# Patient Record
Sex: FEMALE | Race: WHITE | NOT HISPANIC OR LATINO
[De-identification: names, ages, dates, MRNs, and addresses within clinical notes are randomized per-mention and may not be internally consistent; named-entity substitution may affect disease eponyms.]

---

## 2020-02-21 PROBLEM — Z00.00 ENCOUNTER FOR PREVENTIVE HEALTH EXAMINATION: Status: ACTIVE | Noted: 2020-02-21

## 2020-04-14 ENCOUNTER — APPOINTMENT (OUTPATIENT)
Dept: ORTHOPEDIC SURGERY | Facility: CLINIC | Age: 55
End: 2020-04-14
Payer: COMMERCIAL

## 2020-04-14 VITALS — SYSTOLIC BLOOD PRESSURE: 133 MMHG | TEMPERATURE: 98 F | DIASTOLIC BLOOD PRESSURE: 84 MMHG | HEART RATE: 67 BPM

## 2020-04-14 PROCEDURE — 99203 OFFICE O/P NEW LOW 30 MIN: CPT

## 2020-04-14 PROCEDURE — 73564 X-RAY EXAM KNEE 4 OR MORE: CPT | Mod: RT

## 2020-04-14 NOTE — DISCUSSION/SUMMARY
[de-identified] : 53 y/o female with right knee pain secondary to osteoarthritis.  A lengthy discussion was held with the patient regarding the patient’s condition and treatment options including all risks, benefits, prognosis and outcomes of each were discussed in detail. Patient seemed concerned regarding her meniscal tear however, her main issue and the reason for her pain is her osteoarthritis. The patient would like to continue with conservative management at this time. Non-operative treatment was advised and knee treatment handout was given and reviewed with the patient. Multiple non-operative treatment options have been discussed with the patient for treating knee pain secondary to osteoarthritis.\par These include:\par Weight reduction (speak to PMD, speak to nutritionist) \par Low-impact exercise specifically emphasizing bicycling, swimming and working with weights.\par Modification of stressful activities\par Change to lace up supportive well cushioned walking shoes\par Use of a knee sleeve with or without compression support and or a patellar cut-out\par P.R.N. use of acetaminophen if tolerated\par Tylenol and Aleve together as tolerated\par P.R.N. use of anti-inflammatory agents if tolerated\par Potential use of nutraceuticals\par Potential corticosteroid injection\par Potential PRP injection\par Potential hyaluronic acid general injections\par \par Possible surgical intervention if conservative treatment fails also discussed. Patient will most likely be indicated for a total knee replacement. When to do surgery was also discussed: when to do surgery. Rule number 1, do it when you want to do it. Should do the surgery when all else fails. Should do the surgery when the patient is consumed by the her knee pain and limits of daily activity. Should do surgery when the patient is ready to recover from the surgery. Should do surgery when the patient develops fixed deformity. \par \par \par The patient will contact me if there are any concerns otherwise follow up will be on a prn basis. The patient expressed understanding and all questions were answered.\par \par

## 2020-04-14 NOTE — HISTORY OF PRESENT ILLNESS
[de-identified] : 55 y/o female who is a  presents with right knee pain for years. She denies any recent injury or trauma. She went to see Dr. Nelson from [facility] and has been treated with Orthovisc for the past 4 years. The last Orthovisc did not seem to help and recent had a cortisone injection on 2/7/2020.  Her pain was a 9/10 at that time. \par \par She states the pain is a 5/10 at this visit, which she takes Advil occasionally. She scotty any numbness or tingling. She has diffficulty with stairs and ADL's. Before the cortisone injection the pain was keeping her up at night.

## 2020-04-14 NOTE — CONSULT LETTER
[Dear  ___] : Dear  [unfilled], [FreeTextEntry1] : I had the pleasure of evaluating your patient in the office today for complaints of right knee pain secondary to osteoarthritis. I have enclosed a copy of today's office notes for your charts and for your review.\par \par Sincerely, \par \par Michael Jeffrey M.D.\par Professor and \par Department of Orthopedic Surgery\par [facility]\par

## 2020-04-14 NOTE — PHYSICAL EXAM
[LE] : Sensory: Intact in bilateral lower extremities [Knee] : patellar 2+ and symmetric bilaterally [PT] : posterior tibial 2+ and symmetric bilaterally [de-identified] : 55 y/o pleasant  female in NAD and AAOx3. The pt has a mildly antalgic gait. Physical examination of both knees reveals normal contours, skin intact with no signs of infection, no erythema, no swelling, no effusion, no distal lymphedema or phlebitis, no patholaxity. ROM of the right  knee reveals 0°-120° Strength is 5/5 within this arc of motion. There is mild pain on palpation to the medial>lateral joint line. Patient noted to have genu varus. There are no neurological deficits. \par Mild retropatellar pain\par 2+ patellofemoral crepitus \par 2+ distal pulses (PT/DP) [de-identified] : X-rays were ordered, obtained, and interpreted by me today: 4 views of the right knee demonstrate medial joint space narrowing, medial and lateral spurs, tricompartmental arthritis,with no acute fracture or dislocation.\par \par MRI of right knee done 2/22/20 at [facility] reviewed, demonstrates tricompartmental arthritis, no ligament tear and no acute fracture.

## 2021-05-26 ENCOUNTER — NON-APPOINTMENT (OUTPATIENT)
Age: 56
End: 2021-05-26

## 2021-06-30 ENCOUNTER — NON-APPOINTMENT (OUTPATIENT)
Age: 56
End: 2021-06-30

## 2021-06-30 ENCOUNTER — APPOINTMENT (OUTPATIENT)
Dept: DERMATOLOGY | Facility: CLINIC | Age: 56
End: 2021-06-30
Payer: COMMERCIAL

## 2021-06-30 VITALS — WEIGHT: 174 LBS | HEIGHT: 62 IN | BODY MASS INDEX: 32.02 KG/M2

## 2021-06-30 DIAGNOSIS — Z87.39 PERSONAL HISTORY OF OTHER DISEASES OF THE MUSCULOSKELETAL SYSTEM AND CONNECTIVE TISSUE: ICD-10-CM

## 2021-06-30 DIAGNOSIS — Z78.9 OTHER SPECIFIED HEALTH STATUS: ICD-10-CM

## 2021-06-30 PROCEDURE — 99072 ADDL SUPL MATRL&STAF TM PHE: CPT

## 2021-06-30 PROCEDURE — 99204 OFFICE O/P NEW MOD 45 MIN: CPT

## 2021-06-30 RX ORDER — TERBINAFINE HYDROCHLORIDE 250 MG/1
250 TABLET ORAL
Refills: 0 | Status: ACTIVE | COMMUNITY

## 2021-06-30 NOTE — ASSESSMENT
[FreeTextEntry1] : Rosacea;  pt. states was dx and treated for this in past;\par Therapeutic options and their risks and benefits; along with multiple diagnostic possibilities were discussed at length; risks and benefits of further study were discussed;\par \par avoid picking, popping lesions; \par \par Metrogel BID, may reduce to qd when improved;  discussed SPF, cover cosmetics; \par recheck 2- 3 mos\par

## 2021-09-29 ENCOUNTER — APPOINTMENT (OUTPATIENT)
Dept: DERMATOLOGY | Facility: CLINIC | Age: 56
End: 2021-09-29
Payer: COMMERCIAL

## 2021-09-29 PROCEDURE — 99213 OFFICE O/P EST LOW 20 MIN: CPT

## 2021-09-29 NOTE — ASSESSMENT
[FreeTextEntry1] : Good control;  \par Therapeutic options and their risks and benefits; along with multiple diagnostic possibilities were discussed at length; risks and benefits of further study were discussed;\par \par continue metrogel qd;  f/u in Spring;  t/c adding other treatments prn

## 2021-10-12 ENCOUNTER — OUTPATIENT (OUTPATIENT)
Dept: OUTPATIENT SERVICES | Facility: HOSPITAL | Age: 56
LOS: 1 days | End: 2021-10-12
Payer: COMMERCIAL

## 2021-10-12 VITALS
TEMPERATURE: 98 F | HEART RATE: 55 BPM | OXYGEN SATURATION: 99 % | DIASTOLIC BLOOD PRESSURE: 82 MMHG | RESPIRATION RATE: 14 BRPM | WEIGHT: 167.99 LBS | SYSTOLIC BLOOD PRESSURE: 122 MMHG | HEIGHT: 62 IN

## 2021-10-12 DIAGNOSIS — M75.100 UNSPECIFIED ROTATOR CUFF TEAR OR RUPTURE OF UNSPECIFIED SHOULDER, NOT SPECIFIED AS TRAUMATIC: ICD-10-CM

## 2021-10-12 DIAGNOSIS — Z98.890 OTHER SPECIFIED POSTPROCEDURAL STATES: Chronic | ICD-10-CM

## 2021-10-12 DIAGNOSIS — M75.122 COMPLETE ROTATOR CUFF TEAR OR RUPTURE OF LEFT SHOULDER, NOT SPECIFIED AS TRAUMATIC: ICD-10-CM

## 2021-10-12 DIAGNOSIS — M75.100 UNSPECIFIED ROTATOR CUFF TEAR OR RUPTURE OF UNSPECIFIED SHOULDER, NOT SPECIFIED AS TRAUMATIC: Chronic | ICD-10-CM

## 2021-10-12 DIAGNOSIS — M75.120 COMPLETE ROTATOR CUFF TEAR OR RUPTURE OF UNSPECIFIED SHOULDER, NOT SPECIFIED AS TRAUMATIC: Chronic | ICD-10-CM

## 2021-10-12 DIAGNOSIS — Z98.82 BREAST IMPLANT STATUS: Chronic | ICD-10-CM

## 2021-10-12 LAB
ANION GAP SERPL CALC-SCNC: 14 MMOL/L — SIGNIFICANT CHANGE UP (ref 7–14)
BUN SERPL-MCNC: 17 MG/DL — SIGNIFICANT CHANGE UP (ref 7–23)
CALCIUM SERPL-MCNC: 9.8 MG/DL — SIGNIFICANT CHANGE UP (ref 8.4–10.5)
CHLORIDE SERPL-SCNC: 103 MMOL/L — SIGNIFICANT CHANGE UP (ref 98–107)
CO2 SERPL-SCNC: 22 MMOL/L — SIGNIFICANT CHANGE UP (ref 22–31)
CREAT SERPL-MCNC: 0.6 MG/DL — SIGNIFICANT CHANGE UP (ref 0.5–1.3)
GLUCOSE SERPL-MCNC: 75 MG/DL — SIGNIFICANT CHANGE UP (ref 70–99)
HCG SERPL-ACNC: <5 MIU/ML — SIGNIFICANT CHANGE UP
HCT VFR BLD CALC: 41.8 % — SIGNIFICANT CHANGE UP (ref 34.5–45)
HGB BLD-MCNC: 13.8 G/DL — SIGNIFICANT CHANGE UP (ref 11.5–15.5)
MCHC RBC-ENTMCNC: 29.4 PG — SIGNIFICANT CHANGE UP (ref 27–34)
MCHC RBC-ENTMCNC: 33 GM/DL — SIGNIFICANT CHANGE UP (ref 32–36)
MCV RBC AUTO: 89.1 FL — SIGNIFICANT CHANGE UP (ref 80–100)
NRBC # BLD: 0 /100 WBCS — SIGNIFICANT CHANGE UP
NRBC # FLD: 0 K/UL — SIGNIFICANT CHANGE UP
PLATELET # BLD AUTO: 244 K/UL — SIGNIFICANT CHANGE UP (ref 150–400)
POTASSIUM SERPL-MCNC: 4.1 MMOL/L — SIGNIFICANT CHANGE UP (ref 3.5–5.3)
POTASSIUM SERPL-SCNC: 4.1 MMOL/L — SIGNIFICANT CHANGE UP (ref 3.5–5.3)
RBC # BLD: 4.69 M/UL — SIGNIFICANT CHANGE UP (ref 3.8–5.2)
RBC # FLD: 13.2 % — SIGNIFICANT CHANGE UP (ref 10.3–14.5)
SODIUM SERPL-SCNC: 139 MMOL/L — SIGNIFICANT CHANGE UP (ref 135–145)
WBC # BLD: 5.79 K/UL — SIGNIFICANT CHANGE UP (ref 3.8–10.5)
WBC # FLD AUTO: 5.79 K/UL — SIGNIFICANT CHANGE UP (ref 3.8–10.5)

## 2021-10-12 PROCEDURE — 93010 ELECTROCARDIOGRAM REPORT: CPT

## 2021-10-12 NOTE — H&P PST ADULT - NSICDXPASTSURGICALHX_GEN_ALL_CORE_FT
PAST SURGICAL HISTORY:  H/O arthroscopy of right knee 10 years ago    H/O bilateral breast implants     S/P abdominoplasty     S/P  Section  &      PAST SURGICAL HISTORY:  H/O arthroscopy of right knee 10 years ago    H/O bilateral breast implants     Rotator cuff tear right ,     S/P abdominoplasty     S/P  Section  &     S/P LASIK surgery

## 2021-10-12 NOTE — H&P PST ADULT - HISTORY OF PRESENT ILLNESS
55y/o female scheduled for left shoulder arthroscopy debridement subacromial decompression rotator cuff repair biceps tenodesis on 10/26/2021.  Pt states, "has left shoulder intermittent pain for the past 5 yrs , pain progressively worsened since 3/2021.  MRI shows rotator cuff tear."

## 2021-10-12 NOTE — H&P PST ADULT - MUSCULOSKELETAL COMMENTS
bilateral knees and feet, left shoulder pain with rom, MRI shows rotator cuff tear left shoulder pain with rom

## 2021-10-12 NOTE — H&P PST ADULT - VENOUS THROMBOEMBOLISM CURRENT STATUS
PCP for Routine Care (1) other risk factor (includes escalating BMI, pack-years of smoking, diabetes requiring insulin, chemotherapy, female gender and length of surgery)

## 2021-10-12 NOTE — H&P PST ADULT - PROBLEM SELECTOR PLAN 1
Pt scheduled for left shoulder arthroscopy debridement subacromial decompression rotator cuff repair biceps tenodesis on 10/26/2021.  labs done results pending, ekg done.  Pt scheduled for preop covid testing.  Urine cup provided.  Hibiclens provided-  written and verbal instructions given, pt able to verbalize understanding.  Preop teaching done, pt able to verbalize understanding.   medications day of procedure-  pepcid  pst request  comparison ekg in chart.

## 2021-10-12 NOTE — H&P PST ADULT - ATTENDING COMMENTS
To the best of my ability as an orthopaedic surgeon, I assert this.  there are no changes orthopaedically.  Otherwise as per anesthesia and/or medicine.

## 2021-10-12 NOTE — H&P PST ADULT - NSICDXPASTMEDICALHX_GEN_ALL_CORE_FT
PAST MEDICAL HISTORY:  Rotator cuff tear right     PAST MEDICAL HISTORY:  Complete rotator cuff tear left    Rotator cuff tear right     PAST MEDICAL HISTORY:  Complete rotator cuff tear left    Gestational diabetes mellitus (GDM)     Lumbar herniated disc     Rotator cuff tear right

## 2021-10-14 ENCOUNTER — TRANSCRIPTION ENCOUNTER (OUTPATIENT)
Age: 56
End: 2021-10-14

## 2021-10-23 ENCOUNTER — APPOINTMENT (OUTPATIENT)
Dept: DISASTER EMERGENCY | Facility: CLINIC | Age: 56
End: 2021-10-23

## 2021-10-23 DIAGNOSIS — Z01.818 ENCOUNTER FOR OTHER PREPROCEDURAL EXAMINATION: ICD-10-CM

## 2021-10-24 LAB — SARS-COV-2 N GENE NPH QL NAA+PROBE: NOT DETECTED

## 2021-10-25 ENCOUNTER — TRANSCRIPTION ENCOUNTER (OUTPATIENT)
Age: 56
End: 2021-10-25

## 2021-10-25 VITALS
HEIGHT: 62 IN | TEMPERATURE: 98 F | OXYGEN SATURATION: 99 % | DIASTOLIC BLOOD PRESSURE: 84 MMHG | HEART RATE: 84 BPM | RESPIRATION RATE: 18 BRPM | SYSTOLIC BLOOD PRESSURE: 142 MMHG | WEIGHT: 167.99 LBS

## 2021-10-25 NOTE — ASU DISCHARGE PLAN (ADULT/PEDIATRIC) - CARE PROVIDER_API CALL
Rik Maloney)  Orthopaedic Surgery  [address] [place] [id number]  Phone: [phone]  Fax: [phone]  Follow Up Time: 2 weeks

## 2021-10-25 NOTE — ASU DISCHARGE PLAN (ADULT/PEDIATRIC) - FOLLOW UP APPOINTMENTS
[facility] ([facility])

## 2021-10-25 NOTE — ASU DISCHARGE PLAN (ADULT/PEDIATRIC) - PROCEDURE
L shoulder arthroscopy w/ subacromial decompression, rotator cuff repair, biceps tenodesis L shoulder arthroscopy w/ subacromial decompression, rotator cuff repair

## 2021-10-25 NOTE — ASU PREOPERATIVE ASSESSMENT, ADULT (IPARK ONLY) - PROCEDURE
Left shoulder arthroscopy debridement subacromial decompression rotator cuff repair biceps  tenodesis

## 2021-10-26 ENCOUNTER — OUTPATIENT (OUTPATIENT)
Dept: OUTPATIENT SERVICES | Facility: HOSPITAL | Age: 56
LOS: 1 days | Discharge: HOME | End: 2021-10-26

## 2021-10-26 VITALS
RESPIRATION RATE: 16 BRPM | SYSTOLIC BLOOD PRESSURE: 144 MMHG | TEMPERATURE: 98 F | HEART RATE: 83 BPM | DIASTOLIC BLOOD PRESSURE: 73 MMHG | OXYGEN SATURATION: 98 %

## 2021-10-26 DIAGNOSIS — Z98.890 OTHER SPECIFIED POSTPROCEDURAL STATES: Chronic | ICD-10-CM

## 2021-10-26 DIAGNOSIS — M75.100 UNSPECIFIED ROTATOR CUFF TEAR OR RUPTURE OF UNSPECIFIED SHOULDER, NOT SPECIFIED AS TRAUMATIC: Chronic | ICD-10-CM

## 2021-10-26 DIAGNOSIS — Z98.82 BREAST IMPLANT STATUS: Chronic | ICD-10-CM

## 2021-10-26 DIAGNOSIS — M75.122 COMPLETE ROTATOR CUFF TEAR OR RUPTURE OF LEFT SHOULDER, NOT SPECIFIED AS TRAUMATIC: ICD-10-CM

## 2021-10-26 RX ORDER — OXYCODONE HYDROCHLORIDE 5 MG/1
5 TABLET ORAL ONCE
Refills: 0 | Status: DISCONTINUED | OUTPATIENT
Start: 2021-10-26 | End: 2021-10-26

## 2021-10-26 RX ORDER — HYDROMORPHONE HYDROCHLORIDE 2 MG/ML
0.25 INJECTION INTRAMUSCULAR; INTRAVENOUS; SUBCUTANEOUS ONCE
Refills: 0 | Status: DISCONTINUED | OUTPATIENT
Start: 2021-10-26 | End: 2021-10-26

## 2021-10-26 RX ORDER — ONDANSETRON 8 MG/1
4 TABLET, FILM COATED ORAL ONCE
Refills: 0 | Status: DISCONTINUED | OUTPATIENT
Start: 2021-10-26 | End: 2021-11-09

## 2021-10-26 RX ORDER — ASCORBIC ACID 60 MG
1 TABLET,CHEWABLE ORAL
Qty: 0 | Refills: 0 | DISCHARGE

## 2021-10-26 RX ORDER — METOCLOPRAMIDE HCL 10 MG
10 TABLET ORAL ONCE
Refills: 0 | Status: DISCONTINUED | OUTPATIENT
Start: 2021-10-26 | End: 2021-11-09

## 2022-05-10 ENCOUNTER — RESULT REVIEW (OUTPATIENT)
Age: 57
End: 2022-05-10

## 2022-06-16 PROBLEM — M75.120 COMPLETE ROTATOR CUFF TEAR OR RUPTURE OF UNSPECIFIED SHOULDER, NOT SPECIFIED AS TRAUMATIC: Chronic | Status: ACTIVE | Noted: 2021-10-12

## 2022-06-16 PROBLEM — O24.419 GESTATIONAL DIABETES MELLITUS IN PREGNANCY, UNSPECIFIED CONTROL: Chronic | Status: ACTIVE | Noted: 2021-10-12

## 2022-06-16 PROBLEM — M51.26 OTHER INTERVERTEBRAL DISC DISPLACEMENT, LUMBAR REGION: Chronic | Status: ACTIVE | Noted: 2021-10-12

## 2022-06-24 ENCOUNTER — APPOINTMENT (OUTPATIENT)
Dept: ORTHOPEDIC SURGERY | Facility: CLINIC | Age: 57
End: 2022-06-24

## 2022-06-24 VITALS — BODY MASS INDEX: 30.91 KG/M2 | WEIGHT: 168 LBS | HEIGHT: 62 IN

## 2022-06-24 PROCEDURE — 99213 OFFICE O/P EST LOW 20 MIN: CPT | Mod: 25

## 2022-06-24 PROCEDURE — J3490M: CUSTOM

## 2022-06-24 PROCEDURE — 20610 DRAIN/INJ JOINT/BURSA W/O US: CPT | Mod: RT

## 2022-06-24 NOTE — PROCEDURE
[Large Joint Injection] : Large joint injection [Bilateral] : bilaterally of the [Knee] : knee [Pain] : pain [Inflammation] : inflammation [Betadine] : betadine [Ethyl Chloride sprayed topically] : ethyl chloride sprayed topically [___ cc    1%] : Lidocaine ~Vcc of 1%  [___ cc    40mg] : Methylprednisolone (Depomedrol) ~Vcc of 40 mg  [] : Patient tolerated procedure well [Call if redness, pain or fever occur] : call if redness, pain or fever occur [Apply ice for 15min out of every hour for the next 12-24 hours as tolerated] : apply ice for 15 minutes out of every hour for the next 12-24 hours as tolerated [Risks, benefits, alternatives discussed / Verbal consent obtained] : the risks benefits, and alternatives have been discussed, and verbal consent was obtained

## 2022-06-24 NOTE — HISTORY OF PRESENT ILLNESS
[6] : 6 [Sharp] : sharp [Intermittent] : intermittent [Nothing helps with pain getting better] : Nothing helps with pain getting better [Standing] : standing [Stairs] : stairs [de-identified] : 06/24/22:  Patient returns today with recurrent bilateral knee pain x last few weeks duration. Known to have OA both knees. Previous cortisone injections to both knees five months ago.Leaving for vacation and would like to repeat the injections.\par \par MRI Right Knee from February of 2020  \par Impression:\par 1. 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 Mild MCL laxity.\par 3. No acute fracture.\par ________________________________\par \par 1/17/22 Returns today c/o recurring bilateral knee pain x last 2 weeks duration. Worse stairclimbing. Had a cortisone injections x 4 months ago and did well until now.\par \par 09/14/21: Returns today with complaint of recurrent bilateral knee pain. Last cortisone injections to both knees in June of this year which did help, but she did have a fall about five days ago on steps and her right knee pain increased again. Currently it has been improving each day, but pain is worse when getting up from sitting and walking up and down steps. Advil 600 mg for pain, which did help.\par \par 06/28/21: Here today with recurrent bilateral knee pain x last 2-3 weeks duration. had excellent relief from her last cortisone injections x 5 months ago. Would like to repeat injections.\par \par 01/26/21: Returns today with recurrent bilateral knee pain. Last injection in the right knee in November of last year. Pain is right worse than left and worse going down steps more than up steps. No wake up pain. Deals with the pain when it happens.\par \par 11/5/20 returns today c/o recurring rt knee pain x last few weeks duration. Worse stairclimbing and lying in bed. last cortisone injection x 9 months ago.\par \par 3/3/20: Pt here for MRI F/U of the right knee. Pt states she feels 75% after cortisone injection last visit. Not going to PT at this moment\par \par 2/7/20 Returns today will persistent rt knee pain despite the series of orthovisc injections. Lt knee is feeling better. leaving for vacation out of the country tomorrow.\par \par 1/24/20 here for Orthovisc injections #4 both knees.\par \par 1/17/20 Here for Orthovisc injections #3 both knees.\par \par 01/10/20: Returns today for Orthovisc injections #2 both knees. Reports no relief as of yet.\par \par 1/3/20 Here for Orthovisc injections #1 both knees.\par \par 12/20/19: Returns today c/o recurring bilateral knee pain rt > lt x last few weeks duration.Takes no nsaids. Last Orthovisc injections were 8 months ago. Would like to repeat the injections. \par \par 10/3/16: PATIENT IS A 52 YO FEMALE C/O LEFT KNEE PAIN FOR "YEARS." SHE HAS BEEN DX WITH OA BY DR. MENON AND TREATED WITH 2 ORTHOVISC SERIES WHICH SHE HAS HAD GOOD RELIEF WITH. NO N/T. SHE IS CURRENTLY TAKING MELOXICAM. PAIN IS WORSE WITH STAIRS. NO PREVIOUS INJURIES OR SURGERIES TO LEFT KNEE. PAIN OCC. WAKES HER FROM SLEEP.\par OCCUPATION: \par 10/17/16: HERE FOR ORTHOVISC #3 LEFT KNEE.\par 10/24/16: HERE TO FOR ORTHOVISC #4 LEFT KNEE.\par 4/24/17: HERE TODAY FOR NEW INJURY TO HER RIGHT KNEE. STATES HER PAIN HAS FOR 1 MONTH. NO N/T. NOT TAKING ANY MEDICATION FOR PAIN. LEFT KNEE HAS BEEN DOING WELL FROM ORTHOVISC SERIES. RIGHT KNEE WAS PAINFUL WITH STAIRS.\par 10/30/17: HERE TO F/U ON BILATERAL KNEES. STATES GOOD RELIEF WITH VISCO IN LEFT. RIGHT KNEE PAIN PERSISTS, SIMILAR PAIN TO LEFT KNEE.\par 11/6/17: ORTHOVISC #2 BILATERAL KNEES\par 11/13/17: ORTHOVISC #3 BILATERAL KNEES\par 11/20/17: ORTHOVISC #4 BILATERAL KNEES\par 8/30/18- for continued orthovisc both knees #2\par 9/6/18: ORTHOVISC #3 BILAT KNEES\par 9/13/18: ORTHOVISC #4 BILAT KNEES\par 4/4/19: HERE TO F/U ON BILATERAL KNEES. ORTHOVISC GAVE GREAT RELIEF, PAIN STARTED TO RETURN 2 WEEKS AGO.\par 4/19/19 Here for Orthovisc injections #3 both knees.\par 4/26/19 here for Orthovisc injections #4 both knees. [] : no [FreeTextEntry1] : bilateral knees [de-identified] : 1/17/22 [de-identified] : dr garibay

## 2022-06-24 NOTE — PHYSICAL EXAM
[Normal Mood and Affect] : normal mood and affect [Orientated] : orientated [Able to Communicate] : able to communicate [Well Developed] : well developed [Well Nourished] : well nourished [Right] : right knee [Left] : left knee [NL (0)] : extension 0 degrees [5___] : hamstring 5[unfilled]/5 [Negative] : negative anterior draw [] : non-antalgic [TWNoteComboBox7] : flexion 130 degrees

## 2022-10-07 ENCOUNTER — NON-APPOINTMENT (OUTPATIENT)
Age: 57
End: 2022-10-07

## 2022-11-21 ENCOUNTER — NON-APPOINTMENT (OUTPATIENT)
Age: 57
End: 2022-11-21

## 2022-12-01 ENCOUNTER — APPOINTMENT (OUTPATIENT)
Dept: ORTHOPEDIC SURGERY | Facility: CLINIC | Age: 57
End: 2022-12-01

## 2022-12-01 VITALS — WEIGHT: 168 LBS | BODY MASS INDEX: 30.91 KG/M2 | HEIGHT: 62 IN

## 2022-12-01 PROCEDURE — 20610 DRAIN/INJ JOINT/BURSA W/O US: CPT | Mod: RT

## 2022-12-01 PROCEDURE — 99213 OFFICE O/P EST LOW 20 MIN: CPT | Mod: 25

## 2022-12-01 NOTE — HISTORY OF PRESENT ILLNESS
[Sharp] : sharp [Intermittent] : intermittent [Nothing helps with pain getting better] : Nothing helps with pain getting better [Standing] : standing [Stairs] : stairs [2] : 2 [de-identified] : 12/01/22:  Returns today with complaint of recurrent bilateral knee pain.  Previous cortisone injections to both knees about 5 1/2 months ago which helped until now.\par \par 06/24/22:  Patient returns today with recurrent bilateral knee pain x last few weeks duration. Known to have OA both knees. Previous cortisone injections to both knees five months ago.Leaving for vacation and would like to repeat the injections.\par \par MRI Right Knee from February of 2020  \par Impression:\par 1. 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 Mild MCL laxity.\par 3. No acute fracture.\par ________________________________\par \par 1/17/22 Returns today c/o recurring bilateral knee pain x last 2 weeks duration. Worse stairclimbing. Had a cortisone injections x 4 months ago and did well until now.\par \par 09/14/21: Returns today with complaint of recurrent bilateral knee pain. Last cortisone injections to both knees in June of this year which did help, but she did have a fall about five days ago on steps and her right knee pain increased again. Currently it has been improving each day, but pain is worse when getting up from sitting and walking up and down steps. Advil 600 mg for pain, which did help.\par \par 06/28/21: Here today with recurrent bilateral knee pain x last 2-3 weeks duration. had excellent relief from her last cortisone injections x 5 months ago. Would like to repeat injections.\par \par 01/26/21: Returns today with recurrent bilateral knee pain. Last injection in the right knee in November of last year. Pain is right worse than left and worse going down steps more than up steps. No wake up pain. Deals with the pain when it happens.\par \par 11/5/20 returns today c/o recurring rt knee pain x last few weeks duration. Worse stairclimbing and lying in bed. last cortisone injection x 9 months ago.\par \par 3/3/20: Pt here for MRI F/U of the right knee. Pt states she feels 75% after cortisone injection last visit. Not going to PT at this moment\par \par 2/7/20 Returns today will persistent rt knee pain despite the series of orthovisc injections. Lt knee is feeling better. leaving for vacation out of the country tomorrow.\par \par 1/24/20 here for Orthovisc injections #4 both knees.\par \par 1/17/20 Here for Orthovisc injections #3 both knees.\par \par 01/10/20: Returns today for Orthovisc injections #2 both knees. Reports no relief as of yet.\par \par 1/3/20 Here for Orthovisc injections #1 both knees.\par \par 12/20/19: Returns today c/o recurring bilateral knee pain rt > lt x last few weeks duration.Takes no nsaids. Last Orthovisc injections were 8 months ago. Would like to repeat the injections. \par \par 10/3/16: PATIENT IS A 52 YO FEMALE C/O LEFT KNEE PAIN FOR "YEARS." SHE HAS BEEN DX WITH OA BY DR. MENON AND TREATED WITH 2 ORTHOVISC SERIES WHICH SHE HAS HAD GOOD RELIEF WITH. NO N/T. SHE IS CURRENTLY TAKING MELOXICAM. PAIN IS WORSE WITH STAIRS. NO PREVIOUS INJURIES OR SURGERIES TO LEFT KNEE. PAIN OCC. WAKES HER FROM SLEEP.\par OCCUPATION: \par 10/17/16: HERE FOR ORTHOVISC #3 LEFT KNEE.\par 10/24/16: HERE TO FOR ORTHOVISC #4 LEFT KNEE.\par 4/24/17: HERE TODAY FOR NEW INJURY TO HER RIGHT KNEE. STATES HER PAIN HAS FOR 1 MONTH. NO N/T. NOT TAKING ANY MEDICATION FOR PAIN. LEFT KNEE HAS BEEN DOING WELL FROM ORTHOVISC SERIES. RIGHT KNEE WAS PAINFUL WITH STAIRS.\par 10/30/17: HERE TO F/U ON BILATERAL KNEES. STATES GOOD RELIEF WITH VISCO IN LEFT. RIGHT KNEE PAIN PERSISTS, SIMILAR PAIN TO LEFT KNEE.\par 11/6/17: ORTHOVISC #2 BILATERAL KNEES\par 11/13/17: ORTHOVISC #3 BILATERAL KNEES\par 11/20/17: ORTHOVISC #4 BILATERAL KNEES\par 8/30/18- for continued orthovisc both knees #2\par 9/6/18: ORTHOVISC #3 BILAT KNEES\par 9/13/18: ORTHOVISC #4 BILAT KNEES\par 4/4/19: HERE TO F/U ON BILATERAL KNEES. ORTHOVISC GAVE GREAT RELIEF, PAIN STARTED TO RETURN 2 WEEKS AGO.\par 4/19/19 Here for Orthovisc injections #3 both knees.\par 4/26/19 here for Orthovisc injections #4 both knees. [] : no [FreeTextEntry1] : bilateral knees [de-identified] : 1/17/22 [de-identified] : dr garibay

## 2022-12-01 NOTE — PROCEDURE
[Large Joint Injection] : Large joint injection [Bilateral] : bilaterally of the [Knee] : knee [Pain] : pain [X-ray evidence of Osteoarthritis on this or prior visit] : x-ray evidence of Osteoarthritis on this or prior visit [___ cc    1%] : Lidocaine ~Vcc of 1%  [___ cc    40mg] : Methylprednisolone (Depomedrol) ~Vcc of 40 mg  [] : Patient tolerated procedure well [Call if redness, pain or fever occur] : call if redness, pain or fever occur [Apply ice for 15min out of every hour for the next 12-24 hours as tolerated] : apply ice for 15 minutes out of every hour for the next 12-24 hours as tolerated [Risks, benefits, alternatives discussed / Verbal consent obtained] : the risks benefits, and alternatives have been discussed, and verbal consent was obtained

## 2023-01-20 ENCOUNTER — APPOINTMENT (OUTPATIENT)
Dept: DERMATOLOGY | Facility: CLINIC | Age: 58
End: 2023-01-20
Payer: COMMERCIAL

## 2023-01-20 DIAGNOSIS — D48.5 NEOPLASM OF UNCERTAIN BEHAVIOR OF SKIN: ICD-10-CM

## 2023-01-20 DIAGNOSIS — Z41.1 ENCOUNTER FOR COSMETIC SURGERY: ICD-10-CM

## 2023-01-20 PROCEDURE — 99213 OFFICE O/P EST LOW 20 MIN: CPT | Mod: 25

## 2023-01-20 PROCEDURE — 11102 TANGNTL BX SKIN SINGLE LES: CPT

## 2023-01-20 PROCEDURE — D0122: CPT

## 2023-01-20 RX ORDER — METRONIDAZOLE 7.5 MG/G
0.75 GEL TOPICAL
Qty: 1 | Refills: 3 | Status: ACTIVE | COMMUNITY
Start: 2021-06-30 | End: 1900-01-01

## 2023-01-20 NOTE — HISTORY OF PRESENT ILLNESS
[de-identified] : Recheck of face;  Hx rosacea; \par also:  check of ears; Right ear piercing closed recently\par also:  lesion on right shin x over 1 year; \par

## 2023-01-20 NOTE — PHYSICAL EXAM
[FreeTextEntry3] : R shin; \par scaling pearly erythematous patch \par \par face;  erythema, vessels;  no active inflammation; \par \par R ear;  closed piercing hole

## 2023-02-07 LAB — CORE LAB BIOPSY: NORMAL

## 2023-03-10 ENCOUNTER — APPOINTMENT (OUTPATIENT)
Dept: DERMATOLOGY | Facility: CLINIC | Age: 58
End: 2023-03-10
Payer: COMMERCIAL

## 2023-03-10 PROCEDURE — 17262 DSTRJ MAL LES T/A/L 1.1-2.0: CPT

## 2023-03-19 ENCOUNTER — NON-APPOINTMENT (OUTPATIENT)
Age: 58
End: 2023-03-19

## 2023-05-19 ENCOUNTER — APPOINTMENT (OUTPATIENT)
Dept: ORTHOPEDIC SURGERY | Facility: CLINIC | Age: 58
End: 2023-05-19
Payer: COMMERCIAL

## 2023-05-19 VITALS — BODY MASS INDEX: 32.2 KG/M2 | HEIGHT: 62 IN | WEIGHT: 175 LBS

## 2023-05-19 DIAGNOSIS — Z78.9 OTHER SPECIFIED HEALTH STATUS: ICD-10-CM

## 2023-05-19 PROCEDURE — 99213 OFFICE O/P EST LOW 20 MIN: CPT | Mod: 25

## 2023-05-19 PROCEDURE — 20610 DRAIN/INJ JOINT/BURSA W/O US: CPT | Mod: 50

## 2023-05-19 NOTE — HISTORY OF PRESENT ILLNESS
[7] : 7 [3] : 3 [Sharp] : sharp [Intermittent] : intermittent [Rest] : rest [Stairs] : stairs [de-identified] : 05/19/23:  Returns today for complaint of recurrent bilateral knee pain, now over five months after cortisone injections to both knees. Leaving for [place] vacation and would like to repeat the injections.\par \par 12/01/22:  Returns today with complaint of recurrent bilateral knee pain.  Previous cortisone injections to both knees about 5 1/2 months ago which helped until now.\par \par 06/24/22:  Patient returns today with recurrent bilateral knee pain x last few weeks duration. Known to have OA both knees. Previous cortisone injections to both knees five months ago.Leaving for vacation and would like to repeat the injections.\par \par MRI Right Knee from February of 2020  \par Impression:\par 1. 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 Mild MCL laxity.\par 3. No acute fracture.\par ________________________________\par \par 1/17/22 Returns today c/o recurring bilateral knee pain x last 2 weeks duration. Worse stairclimbing. Had a cortisone injections x 4 months ago and did well until now.\par \par 09/14/21: Returns today with complaint of recurrent bilateral knee pain. Last cortisone injections to both knees in June of this year which did help, but she did have a fall about five days ago on steps and her right knee pain increased again. Currently it has been improving each day, but pain is worse when getting up from sitting and walking up and down steps. Advil 600 mg for pain, which did help.\par \par 06/28/21: Here today with recurrent bilateral knee pain x last 2-3 weeks duration. had excellent relief from her last cortisone injections x 5 months ago. Would like to repeat injections.\par \par 01/26/21: Returns today with recurrent bilateral knee pain. Last injection in the right knee in November of last year. Pain is right worse than left and worse going down steps more than up steps. No wake up pain. Deals with the pain when it happens.\par \par 11/5/20 returns today c/o recurring rt knee pain x last few weeks duration. Worse stairclimbing and lying in bed. last cortisone injection x 9 months ago.\par \par 3/3/20: Pt here for MRI F/U of the right knee. Pt states she feels 75% after cortisone injection last visit. Not going to PT at this moment\par \par 2/7/20 Returns today will persistent rt knee pain despite the series of orthovisc injections. Lt knee is feeling better. leaving for vacation out of the country tomorrow.\par \par 1/24/20 here for Orthovisc injections #4 both knees.\par \par 1/17/20 Here for Orthovisc injections #3 both knees.\par \par 01/10/20: Returns today for Orthovisc injections #2 both knees. Reports no relief as of yet.\par \par 1/3/20 Here for Orthovisc injections #1 both knees.\par \par 12/20/19: Returns today c/o recurring bilateral knee pain rt > lt x last few weeks duration.Takes no nsaids. Last Orthovisc injections were 8 months ago. Would like to repeat the injections. \par \par 10/3/16: PATIENT IS A 50 YO FEMALE C/O LEFT KNEE PAIN FOR "YEARS." SHE HAS BEEN DX WITH OA BY DR. MENON AND TREATED WITH 2 ORTHOVISC SERIES WHICH SHE HAS HAD GOOD RELIEF WITH. NO N/T. SHE IS CURRENTLY TAKING MELOXICAM. PAIN IS WORSE WITH STAIRS. NO PREVIOUS INJURIES OR SURGERIES TO LEFT KNEE. PAIN OCC. WAKES HER FROM SLEEP.\par OCCUPATION: \par 10/17/16: HERE FOR ORTHOVISC #3 LEFT KNEE.\par 10/24/16: HERE TO FOR ORTHOVISC #4 LEFT KNEE.\par 4/24/17: HERE TODAY FOR NEW INJURY TO HER RIGHT KNEE. STATES HER PAIN HAS FOR 1 MONTH. NO N/T. NOT TAKING ANY MEDICATION FOR PAIN. LEFT KNEE HAS BEEN DOING WELL FROM ORTHOVISC SERIES. RIGHT KNEE WAS PAINFUL WITH STAIRS.\par 10/30/17: HERE TO F/U ON BILATERAL KNEES. STATES GOOD RELIEF WITH VISCO IN LEFT. RIGHT KNEE PAIN PERSISTS, SIMILAR PAIN TO LEFT KNEE.\par 11/6/17: ORTHOVISC #2 BILATERAL KNEES\par 11/13/17: ORTHOVISC #3 BILATERAL KNEES\par 11/20/17: ORTHOVISC #4 BILATERAL KNEES\par 8/30/18- for continued orthovisc both knees #2\par 9/6/18: ORTHOVISC #3 BILAT KNEES\par 9/13/18: ORTHOVISC #4 BILAT KNEES\par 4/4/19: HERE TO F/U ON BILATERAL KNEES. ORTHOVISC GAVE GREAT RELIEF, PAIN STARTED TO RETURN 2 WEEKS AGO.\par 4/19/19 Here for Orthovisc injections #3 both knees.\par 4/26/19 here for Orthovisc injections #4 both knees. [FreeTextEntry1] : bilateral knees [FreeTextEntry7] : right thigh [de-identified] : 12/1/22 [de-identified] : dr Nelson

## 2023-05-19 NOTE — PROCEDURE
[Large Joint Injection] : Large joint injection [Bilateral] : bilaterally of the [Knee] : knee [Pain] : pain [X-ray evidence of Osteoarthritis on this or prior visit] : x-ray evidence of Osteoarthritis on this or prior visit [Betadine] : betadine [Ethyl Chloride sprayed topically] : ethyl chloride sprayed topically [___ cc    1%] : Lidocaine ~Vcc of 1%  [___ cc    40mg] : Methylprednisolone (Depomedrol) ~Vcc of 40 mg  [] : Patient tolerated procedure well [Apply ice for 15min out of every hour for the next 12-24 hours as tolerated] : apply ice for 15 minutes out of every hour for the next 12-24 hours as tolerated [Call if redness, pain or fever occur] : call if redness, pain or fever occur [Risks, benefits, alternatives discussed / Verbal consent obtained] : the risks benefits, and alternatives have been discussed, and verbal consent was obtained

## 2023-07-22 ENCOUNTER — NON-APPOINTMENT (OUTPATIENT)
Age: 58
End: 2023-07-22

## 2023-08-13 ENCOUNTER — NON-APPOINTMENT (OUTPATIENT)
Age: 58
End: 2023-08-13

## 2023-11-02 ENCOUNTER — APPOINTMENT (OUTPATIENT)
Dept: ORTHOPEDIC SURGERY | Facility: CLINIC | Age: 58
End: 2023-11-02
Payer: COMMERCIAL

## 2023-11-02 VITALS — HEIGHT: 62 IN | BODY MASS INDEX: 32.2 KG/M2 | WEIGHT: 175 LBS

## 2023-11-02 DIAGNOSIS — M43.16 SPONDYLOLISTHESIS, LUMBAR REGION: ICD-10-CM

## 2023-11-02 PROCEDURE — 72070 X-RAY EXAM THORAC SPINE 2VWS: CPT

## 2023-11-02 PROCEDURE — 99213 OFFICE O/P EST LOW 20 MIN: CPT

## 2023-11-02 PROCEDURE — 72110 X-RAY EXAM L-2 SPINE 4/>VWS: CPT

## 2023-11-10 ENCOUNTER — APPOINTMENT (OUTPATIENT)
Dept: ORTHOPEDIC SURGERY | Facility: CLINIC | Age: 58
End: 2023-11-10
Payer: COMMERCIAL

## 2023-11-10 VITALS — WEIGHT: 174 LBS | HEIGHT: 62 IN | BODY MASS INDEX: 32.02 KG/M2

## 2023-11-10 PROCEDURE — 99214 OFFICE O/P EST MOD 30 MIN: CPT | Mod: 25

## 2023-11-10 PROCEDURE — 20610 DRAIN/INJ JOINT/BURSA W/O US: CPT | Mod: 50

## 2024-01-05 ENCOUNTER — APPOINTMENT (OUTPATIENT)
Dept: DERMATOLOGY | Facility: CLINIC | Age: 59
End: 2024-01-05
Payer: COMMERCIAL

## 2024-01-05 DIAGNOSIS — D18.01 HEMANGIOMA OF SKIN AND SUBCUTANEOUS TISSUE: ICD-10-CM

## 2024-01-05 DIAGNOSIS — L71.9 ROSACEA, UNSPECIFIED: ICD-10-CM

## 2024-01-05 DIAGNOSIS — Z87.2 PERSONAL HISTORY OF DISEASES OF THE SKIN AND SUBCUTANEOUS TISSUE: ICD-10-CM

## 2024-01-05 DIAGNOSIS — C44.712 BASAL CELL CARCINOMA OF SKIN OF RIGHT LOWER LIMB, INCLUDING HIP: ICD-10-CM

## 2024-01-05 DIAGNOSIS — L82.1 OTHER SEBORRHEIC KERATOSIS: ICD-10-CM

## 2024-01-05 PROCEDURE — 99214 OFFICE O/P EST MOD 30 MIN: CPT

## 2024-01-05 NOTE — ASSESSMENT
[FreeTextEntry1] : Complete skin examination is negative for malignancy; Multiple new concerns were addressed and discussed. Therapeutic options and their risks and benefits; along with multiple diagnostic possibilities were discussed at length; risks and benefits of skin biopsy and/or other further study were discussed;  SK L inframammary- benign BCC R shin healing well s/p D&C Rosacea;  continue metrogel qd;   Follow up for TBSE in 6 months recent BCC 3/23

## 2024-01-05 NOTE — HISTORY OF PRESENT ILLNESS
[de-identified] : Pt. presents for skin check; c/o few spots of concern;  c/o spot on chest recent BCC tx 3/2023 R shin;  Hx rosacea, uses metrogel qd;  Severity:  mild   Modifying factors:  none Associated symptoms:  none Context:  no association with activity

## 2024-01-05 NOTE — PHYSICAL EXAM
[Full Body Skin Exam Performed] : performed [FreeTextEntry3] : Skin examination performed of the face, neck, trunk, arms, legs;  The patient is well, alert and oriented, pleasant and cooperative. Eyelids, conjunctivae, oral mucosa, digits and nails all normal.   No cervical adenopathy.  Normal findings include:  Scaling waxy stuck on plaque:  Left inferior breast/ inframammary Angiomas + lentigines and solar damage are present in sun exposed areas;  erythema, no active inflammation of face healed D&C scar R mid shin  No lesions were suspicious for malignancy.

## 2024-03-08 ENCOUNTER — NON-APPOINTMENT (OUTPATIENT)
Age: 59
End: 2024-03-08

## 2024-04-14 ENCOUNTER — NON-APPOINTMENT (OUTPATIENT)
Age: 59
End: 2024-04-14

## 2024-04-22 ENCOUNTER — APPOINTMENT (OUTPATIENT)
Dept: ORTHOPEDIC SURGERY | Facility: CLINIC | Age: 59
End: 2024-04-22
Payer: COMMERCIAL

## 2024-04-22 VITALS — HEIGHT: 62 IN | WEIGHT: 174 LBS | BODY MASS INDEX: 32.02 KG/M2

## 2024-04-22 DIAGNOSIS — S83.281D OTHER TEAR OF LATERAL MENISCUS, CURRENT INJURY, RIGHT KNEE, SUBSEQUENT ENCOUNTER: ICD-10-CM

## 2024-04-22 DIAGNOSIS — S83.242D OTHER TEAR OF MEDIAL MENISCUS, CURRENT INJURY, LEFT KNEE, SUBSEQUENT ENCOUNTER: ICD-10-CM

## 2024-04-22 DIAGNOSIS — M17.12 UNILATERAL PRIMARY OSTEOARTHRITIS, LEFT KNEE: ICD-10-CM

## 2024-04-22 PROCEDURE — 99213 OFFICE O/P EST LOW 20 MIN: CPT | Mod: 25

## 2024-04-22 PROCEDURE — 20610 DRAIN/INJ JOINT/BURSA W/O US: CPT | Mod: 50

## 2024-04-22 NOTE — PHYSICAL EXAM
[Normal Mood and Affect] : normal mood and affect [Oriented] : oriented [Able to Communicate] : able to communicate [Well Developed] : well developed [Well Nourished] : well nourished [Right] : right knee [Left] : left knee [NL (0)] : extension 0 degrees [5___] : hamstring 5[unfilled]/5 [Negative] : negative anterior draw [] : non-antalgic [TWNoteComboBox7] : flexion 130 degrees

## 2024-04-22 NOTE — HISTORY OF PRESENT ILLNESS
[8] : 8 [3] : 3 [Sharp] : sharp [Shooting] : shooting [Intermittent] : intermittent [Nothing helps with pain getting better] : Nothing helps with pain getting better [Stairs] : stairs [Full time] : Work status: full time [Steroid] : Steroid [Right Leg] : right leg [de-identified] : 04/22/24:  Returns today c/o severe pain in Rt knee/leg and recurring lt knee pain as well x last 3 weeks duration. Last injection x 5 months ago which helped until now.  11/10/23:  Patient is here today complaining of recurrent bilateral knee pain almost six months after cortisone injections to both knees. Would like to repeat the injections.  05/19/23:  Returns today for complaint of recurrent bilateral knee pain, now over five months after cortisone injections to both knees. Leaving for [place] vacation and would like to repeat the injections.  12/01/22:  Returns today with complaint of recurrent bilateral knee pain.  Previous cortisone injections to both knees about 5 1/2 months ago which helped until now.  06/24/22:  Patient returns today with recurrent bilateral knee pain x last few weeks duration. Known to have OA both knees. Previous cortisone injections to both knees five months ago.Leaving for vacation and would like to repeat the injections.  MRI Right Knee from February of 2020   Impression: 1. 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 2. Mild MCL laxity. 3. No acute fracture. ________________________________  1/17/22 Returns today c/o recurring bilateral knee pain x last 2 weeks duration. Worse stairclimbing. Had a cortisone injections x 4 months ago and did well until now.  09/14/21: Returns today with complaint of recurrent bilateral knee pain. Last cortisone injections to both knees in June of this year which did help, but she did have a fall about five days ago on steps and her right knee pain increased again. Currently it has been improving each day, but pain is worse when getting up from sitting and walking up and down steps. Advil 600 mg for pain, which did help.  06/28/21: Here today with recurrent bilateral knee pain x last 2-3 weeks duration. had excellent relief from her last cortisone injections x 5 months ago. Would like to repeat injections.  01/26/21: Returns today with recurrent bilateral knee pain. Last injection in the right knee in November of last year. Pain is right worse than left and worse going down steps more than up steps. No wake up pain. Deals with the pain when it happens.  11/5/20 returns today c/o recurring rt knee pain x last few weeks duration. Worse stairclimbing and lying in bed. last cortisone injection x 9 months ago.  3/3/20: Pt here for MRI F/U of the right knee. Pt states she feels 75% after cortisone injection last visit. Not going to PT at this moment  2/7/20 Returns today will persistent rt knee pain despite the series of orthovisc injections. Lt knee is feeling better. leaving for vacation out of the country tomorrow.  1/24/20 here for Orthovisc injections #4 both knees.  1/17/20 Here for Orthovisc injections #3 both knees.  01/10/20: Returns today for Orthovisc injections #2 both knees. Reports no relief as of yet.  1/3/20 Here for Orthovisc injections #1 both knees.  12/20/19: Returns today c/o recurring bilateral knee pain rt > lt x last few weeks duration.Takes no nsaids. Last Orthovisc injections were 8 months ago. Would like to repeat the injections.   10/3/16: PATIENT IS A 52 YO FEMALE C/O LEFT KNEE PAIN FOR "YEARS." SHE HAS BEEN DX WITH OA BY DR. MENON AND TREATED WITH 2 ORTHOVISC SERIES WHICH SHE HAS HAD GOOD RELIEF WITH. NO N/T. SHE IS CURRENTLY TAKING MELOXICAM. PAIN IS WORSE WITH STAIRS. NO PREVIOUS INJURIES OR SURGERIES TO LEFT KNEE. PAIN OCC. WAKES HER FROM SLEEP. OCCUPATION:  10/17/16: HERE FOR ORTHOVISC #3 LEFT KNEE. 10/24/16: HERE TO FOR ORTHOVISC #4 LEFT KNEE. 4/24/17: HERE TODAY FOR NEW INJURY TO HER RIGHT KNEE. STATES HER PAIN HAS FOR 1 MONTH. NO N/T. NOT TAKING ANY MEDICATION FOR PAIN. LEFT KNEE HAS BEEN DOING WELL FROM ORTHOVISC SERIES. RIGHT KNEE WAS PAINFUL WITH STAIRS. 10/30/17: HERE TO F/U ON BILATERAL KNEES. STATES GOOD RELIEF WITH VISCO IN LEFT. RIGHT KNEE PAIN PERSISTS, SIMILAR PAIN TO LEFT KNEE. 11/6/17: ORTHOVISC #2 BILATERAL KNEES 11/13/17: ORTHOVISC #3 BILATERAL KNEES 11/20/17: ORTHOVISC #4 BILATERAL KNEES 8/30/18- for continued orthovisc both knees #2 9/6/18: ORTHOVISC #3 BILAT KNEES 9/13/18: ORTHOVISC #4 BILAT KNEES 4/4/19: HERE TO F/U ON BILATERAL KNEES. ORTHOVISC GAVE GREAT RELIEF, PAIN STARTED TO RETURN 2 WEEKS AGO. 4/19/19 Here for Orthovisc injections #3 both knees. 4/26/19 here for Orthovisc injections #4 both knees. [] : Post Surgical Visit: no [FreeTextEntry1] : TREASURE knees [FreeTextEntry5] : pain had worsen recently [de-identified] :  [de-identified] : 5/19/23 [de-identified] : TREASURE knees

## 2024-04-22 NOTE — PLAN
[TextEntry] : The natural progression of osteoarthritis was explained to the patient.  We discussed the possible treatment options from conservative to operative.  These included NSAIDs, Glucosamine and Chondroitin sulfate, and physical therapy as well different types of injections.  We also discussed that at some point they may progress to need a TKA.  Information and pamphlets were given.   Cortisone injection given today, both knees.  Medication was injected into the above treated area. After verbal consent using sterile preparation and technique. The risks, benefits, and alternatives to cortisone injection were explained in full to the patient. Risks outlined include but are not limited to infection, sepsis, bleeding, scarring, skin discoloration, temporary increase in pain, syncopal episode, failure to resolve symptoms, allergic reaction, symptom recurrence, and elevation of blood sugar in diabetics. Patient understood the risks. All questions were answered. After discussion of options, patient requested an injection. Oral informed consent was obtained and sterile prep was done of the injection site. Sterile technique was utilized for the procedure including the preparation of the solutions used for the injection. Patient tolerated the procedure well. Advised to ice the injection site this evening. Prep with Betadine locally to site. Sterile technique used. Patient tolerated procedure well. Post Procedure Instructions: Patient was advised to call if redness, pain, or fever occur and apply ice for 15 min. out of every hour for the next 12-24 hours as tolerated.

## 2024-05-04 ENCOUNTER — APPOINTMENT (OUTPATIENT)
Dept: MRI IMAGING | Facility: CLINIC | Age: 59
End: 2024-05-04
Payer: COMMERCIAL

## 2024-05-04 PROCEDURE — 73721 MRI JNT OF LWR EXTRE W/O DYE: CPT | Mod: RT

## 2024-06-28 ENCOUNTER — APPOINTMENT (OUTPATIENT)
Dept: ORTHOPEDIC SURGERY | Facility: CLINIC | Age: 59
End: 2024-06-28
Payer: COMMERCIAL

## 2024-06-28 VITALS — HEIGHT: 62 IN | BODY MASS INDEX: 32.02 KG/M2 | WEIGHT: 174 LBS

## 2024-06-28 VITALS — SYSTOLIC BLOOD PRESSURE: 135 MMHG | DIASTOLIC BLOOD PRESSURE: 82 MMHG | HEART RATE: 71 BPM

## 2024-06-28 DIAGNOSIS — M17.11 UNILATERAL PRIMARY OSTEOARTHRITIS, RIGHT KNEE: ICD-10-CM

## 2024-06-28 PROCEDURE — 99204 OFFICE O/P NEW MOD 45 MIN: CPT

## 2024-06-28 PROCEDURE — 73564 X-RAY EXAM KNEE 4 OR MORE: CPT | Mod: RT

## 2024-07-29 ENCOUNTER — NON-APPOINTMENT (OUTPATIENT)
Age: 59
End: 2024-07-29

## 2024-08-12 ENCOUNTER — NON-APPOINTMENT (OUTPATIENT)
Age: 59
End: 2024-08-12

## 2024-08-30 ENCOUNTER — APPOINTMENT (OUTPATIENT)
Dept: DERMATOLOGY | Facility: CLINIC | Age: 59
End: 2024-08-30
Payer: COMMERCIAL

## 2024-08-30 DIAGNOSIS — C44.712 BASAL CELL CARCINOMA OF SKIN OF RIGHT LOWER LIMB, INCLUDING HIP: ICD-10-CM

## 2024-08-30 DIAGNOSIS — L71.9 ROSACEA, UNSPECIFIED: ICD-10-CM

## 2024-08-30 DIAGNOSIS — D18.01 HEMANGIOMA OF SKIN AND SUBCUTANEOUS TISSUE: ICD-10-CM

## 2024-08-30 DIAGNOSIS — B35.1 TINEA UNGUIUM: ICD-10-CM

## 2024-08-30 PROCEDURE — 99214 OFFICE O/P EST MOD 30 MIN: CPT

## 2024-08-30 NOTE — HISTORY OF PRESENT ILLNESS
[de-identified] : Pt. presents for skin check; c/o few spots of concern;  also:  has Hx onychomycosis, tx x 2 with terbinafine recent BCC tx 3/2023 R shin;  Hx rosacea, uses metrogel qd;  Severity:  mild   Modifying factors:  none Associated symptoms:  none Context:  no association with activity

## 2024-08-30 NOTE — PHYSICAL EXAM
[Full Body Skin Exam Performed] : performed [FreeTextEntry3] : Skin examination performed of the face, neck, trunk, arms, legs;  The patient is well, alert and oriented, pleasant and cooperative. Eyelids, conjunctivae, oral mucosa, digits and nails all normal.   No cervical adenopathy.  Normal findings include:  Scaling waxy stuck on plaque:  right neck Angiomas + lentigines and solar damage are present in sun exposed areas;  erythema, no active inflammation of face healed D&C scar R mid shin  No lesions were suspicious for malignancy.

## 2024-08-30 NOTE — ASSESSMENT
[FreeTextEntry1] : Complete skin examination is negative for malignancy; Multiple new concerns were addressed and discussed. Therapeutic options and their risks and benefits; along with multiple diagnostic possibilities were discussed at length; risks and benefits of skin biopsy and/or other further study were discussed;  BCC R shin healing well s/p D&C Rosacea;  renew metrogel qd;   Follow up for TBSE in 6 months recent BCC 3/23- T/C annual thereafter  also:  Hx onycho;  will check at next exam  also: having Right TKA in 10/2024

## 2024-09-06 ENCOUNTER — NON-APPOINTMENT (OUTPATIENT)
Age: 59
End: 2024-09-06

## 2024-09-06 ENCOUNTER — APPOINTMENT (OUTPATIENT)
Dept: INTERNAL MEDICINE | Facility: CLINIC | Age: 59
End: 2024-09-06
Payer: COMMERCIAL

## 2024-09-06 VITALS
OXYGEN SATURATION: 98 % | HEIGHT: 62 IN | BODY MASS INDEX: 33.86 KG/M2 | RESPIRATION RATE: 15 BRPM | WEIGHT: 184 LBS | TEMPERATURE: 98.5 F | DIASTOLIC BLOOD PRESSURE: 80 MMHG | SYSTOLIC BLOOD PRESSURE: 140 MMHG | HEART RATE: 55 BPM

## 2024-09-06 DIAGNOSIS — R00.1 BRADYCARDIA, UNSPECIFIED: ICD-10-CM

## 2024-09-06 DIAGNOSIS — Z82.61 FAMILY HISTORY OF ARTHRITIS: ICD-10-CM

## 2024-09-06 DIAGNOSIS — R94.31 ABNORMAL ELECTROCARDIOGRAM [ECG] [EKG]: ICD-10-CM

## 2024-09-06 DIAGNOSIS — Z23 ENCOUNTER FOR IMMUNIZATION: ICD-10-CM

## 2024-09-06 DIAGNOSIS — Z78.9 OTHER SPECIFIED HEALTH STATUS: ICD-10-CM

## 2024-09-06 DIAGNOSIS — Z00.00 ENCOUNTER FOR GENERAL ADULT MEDICAL EXAMINATION W/OUT ABNORMAL FINDINGS: ICD-10-CM

## 2024-09-06 DIAGNOSIS — Z82.49 FAMILY HISTORY OF ISCHEMIC HEART DISEASE AND OTHER DISEASES OF THE CIRCULATORY SYSTEM: ICD-10-CM

## 2024-09-06 DIAGNOSIS — Z81.1 FAMILY HISTORY OF ALCOHOL ABUSE AND DEPENDENCE: ICD-10-CM

## 2024-09-06 PROCEDURE — 99386 PREV VISIT NEW AGE 40-64: CPT | Mod: 25

## 2024-09-06 PROCEDURE — 90715 TDAP VACCINE 7 YRS/> IM: CPT

## 2024-09-06 PROCEDURE — 93000 ELECTROCARDIOGRAM COMPLETE: CPT

## 2024-09-06 PROCEDURE — 90471 IMMUNIZATION ADMIN: CPT

## 2024-09-06 NOTE — HEALTH RISK ASSESSMENT
[2 - 4 times a month (2 pts)] : 2-4 times a month (2 points) [1 or 2 (0 pts)] : 1 or 2 (0 points) [Yes] : In the past 12 months have you used drugs other than those required for medical reasons? Yes [Little interest or pleasure doing things] : 1) Little interest or pleasure doing things [Feeling down, depressed, or hopeless] : 2) Feeling down, depressed, or hopeless [0] : 2) Feeling down, depressed, or hopeless: Not at all (0) [PHQ-2 Negative - No further assessment needed] : PHQ-2 Negative - No further assessment needed [Never] : Never [Patient reported mammogram was normal] : Patient reported mammogram was normal [Patient reported colonoscopy was normal] : Patient reported colonoscopy was normal [Fully functional (bathing, dressing, toileting, transferring, walking, feeding)] : Fully functional (bathing, dressing, toileting, transferring, walking, feeding) [Fully functional (using the telephone, shopping, preparing meals, housekeeping, doing laundry, using] : Fully functional and needs no help or supervision to perform IADLs (using the telephone, shopping, preparing meals, housekeeping, doing laundry, using transportation, managing medications and managing finances) [de-identified] : smokes pot once a week [[id number]] : 0 [MammogramDate] : 05/24 [PapSmearComments] : sees gyn yearly [ColonoscopyDate] : 08/15 [ColonoscopyComments] : had bleeding afterwards and ended up in the hospital

## 2024-09-07 LAB
25(OH)D3 SERPL-MCNC: 41.8 NG/ML
ALBUMIN SERPL ELPH-MCNC: 4.5 G/DL
ALP BLD-CCNC: 54 U/L
ALT SERPL-CCNC: 18 U/L
ANION GAP SERPL CALC-SCNC: 12 MMOL/L
AST SERPL-CCNC: 16 U/L
BASOPHILS # BLD AUTO: 0.03 K/UL
BASOPHILS NFR BLD AUTO: 0.4 %
BILIRUB SERPL-MCNC: 0.5 MG/DL
BUN SERPL-MCNC: 29 MG/DL
CALCIUM SERPL-MCNC: 9.6 MG/DL
CHLORIDE SERPL-SCNC: 105 MMOL/L
CHOLEST SERPL-MCNC: 241 MG/DL
CO2 SERPL-SCNC: 24 MMOL/L
CREAT SERPL-MCNC: 0.8 MG/DL
EGFR: 84 ML/MIN/1.73M2
EOSINOPHIL # BLD AUTO: 0.09 K/UL
EOSINOPHIL NFR BLD AUTO: 1.2 %
ESTIMATED AVERAGE GLUCOSE: 108 MG/DL
FOLATE SERPL-MCNC: >20 NG/ML
GLUCOSE SERPL-MCNC: 122 MG/DL
HBA1C MFR BLD HPLC: 5.4 %
HCT VFR BLD CALC: 43.3 %
HCV AB SER QL: NONREACTIVE
HCV S/CO RATIO: 0.11 S/CO
HDLC SERPL-MCNC: 120 MG/DL
HGB BLD-MCNC: 13.8 G/DL
IMM GRANULOCYTES NFR BLD AUTO: 0.3 %
LDLC SERPL CALC-MCNC: 115 MG/DL
LYMPHOCYTES # BLD AUTO: 2.22 K/UL
LYMPHOCYTES NFR BLD AUTO: 29.8 %
MAN DIFF?: NORMAL
MCHC RBC-ENTMCNC: 29.5 PG
MCHC RBC-ENTMCNC: 31.9 GM/DL
MCV RBC AUTO: 92.5 FL
MONOCYTES # BLD AUTO: 0.56 K/UL
MONOCYTES NFR BLD AUTO: 7.5 %
NEUTROPHILS # BLD AUTO: 4.53 K/UL
NEUTROPHILS NFR BLD AUTO: 60.8 %
NONHDLC SERPL-MCNC: 122 MG/DL
PLATELET # BLD AUTO: 227 K/UL
POTASSIUM SERPL-SCNC: 4.8 MMOL/L
PROT SERPL-MCNC: 6.7 G/DL
RBC # BLD: 4.68 M/UL
RBC # FLD: 13.1 %
SODIUM SERPL-SCNC: 141 MMOL/L
TRIGL SERPL-MCNC: 41 MG/DL
TSH SERPL-ACNC: 0.8 UIU/ML
VIT B12 SERPL-MCNC: 1025 PG/ML
WBC # FLD AUTO: 7.45 K/UL

## 2024-09-20 ENCOUNTER — APPOINTMENT (OUTPATIENT)
Dept: ORTHOPEDIC SURGERY | Facility: CLINIC | Age: 59
End: 2024-09-20

## 2024-09-20 VITALS — WEIGHT: 184 LBS | HEIGHT: 62 IN | BODY MASS INDEX: 33.86 KG/M2

## 2024-09-20 DIAGNOSIS — M17.12 UNILATERAL PRIMARY OSTEOARTHRITIS, LEFT KNEE: ICD-10-CM

## 2024-09-20 PROCEDURE — 20610 DRAIN/INJ JOINT/BURSA W/O US: CPT | Mod: LT

## 2024-09-20 NOTE — REASON FOR VISIT
[FreeTextEntry2] : f/u LT knee. Knee replacement on 10/23 in the right knee. Here for injection in the left knee today.

## 2024-09-20 NOTE — HISTORY OF PRESENT ILLNESS
[de-identified] : 09/20/24:  Patient returns complaining of recurrent left knee pain.  Last cortisone injection about 5 months ago. Pt is scheduled for upcoming rt TKR next month.. Would like to repeat the injection lt knee.  04/22/24:  Returns today c/o severe pain in Rt knee/leg and recurring lt knee pain as well x last 3 weeks duration. Last injection x 5 months ago which helped until now.  11/10/23:  Patient is here today complaining of recurrent bilateral knee pain almost six months after cortisone injections to both knees. Would like to repeat the injections.  05/19/23:  Returns today for complaint of recurrent bilateral knee pain, now over five months after cortisone injections to both knees. Leaving for [place] vacation and would like to repeat the injections.  12/01/22:  Returns today with complaint of recurrent bilateral knee pain.  Previous cortisone injections to both knees about 5 1/2 months ago which helped until now.  06/24/22:  Patient returns today with recurrent bilateral knee pain x last few weeks duration. Known to have OA both knees. Previous cortisone injections to both knees five months ago.Leaving for vacation and would like to repeat the injections.  MRI Right Knee from February of 2020   Impression: 1. 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 2. Mild MCL laxity. 3. No acute fracture. ________________________________  1/17/22 Returns today c/o recurring bilateral knee pain x last 2 weeks duration. Worse stairclimbing. Had a cortisone injections x 4 months ago and did well until now.  09/14/21: Returns today with complaint of recurrent bilateral knee pain. Last cortisone injections to both knees in June of this year which did help, but she did have a fall about five days ago on steps and her right knee pain increased again. Currently it has been improving each day, but pain is worse when getting up from sitting and walking up and down steps. Advil 600 mg for pain, which did help.  06/28/21: Here today with recurrent bilateral knee pain x last 2-3 weeks duration. had excellent relief from her last cortisone injections x 5 months ago. Would like to repeat injections.  01/26/21: Returns today with recurrent bilateral knee pain. Last injection in the right knee in November of last year. Pain is right worse than left and worse going down steps more than up steps. No wake up pain. Deals with the pain when it happens.  11/5/20 returns today c/o recurring rt knee pain x last few weeks duration. Worse stairclimbing and lying in bed. last cortisone injection x 9 months ago.  3/3/20: Pt here for MRI F/U of the right knee. Pt states she feels 75% after cortisone injection last visit. Not going to PT at this moment  2/7/20 Returns today will persistent rt knee pain despite the series of orthovisc injections. Lt knee is feeling better. leaving for vacation out of the country tomorrow.  1/24/20 here for Orthovisc injections #4 both knees.  1/17/20 Here for Orthovisc injections #3 both knees.  01/10/20: Returns today for Orthovisc injections #2 both knees. Reports no relief as of yet.  1/3/20 Here for Orthovisc injections #1 both knees.  12/20/19: Returns today c/o recurring bilateral knee pain rt > lt x last few weeks duration.Takes no nsaids. Last Orthovisc injections were 8 months ago. Would like to repeat the injections.   10/3/16: PATIENT IS A 50 YO FEMALE C/O LEFT KNEE PAIN FOR "YEARS." SHE HAS BEEN DX WITH OA BY DR. MENON AND TREATED WITH 2 ORTHOVISC SERIES WHICH SHE HAS HAD GOOD RELIEF WITH. NO N/T. SHE IS CURRENTLY TAKING MELOXICAM. PAIN IS WORSE WITH STAIRS. NO PREVIOUS INJURIES OR SURGERIES TO LEFT KNEE. PAIN OCC. WAKES HER FROM SLEEP. OCCUPATION:  10/17/16: HERE FOR ORTHOVISC #3 LEFT KNEE. 10/24/16: HERE TO FOR ORTHOVISC #4 LEFT KNEE. 4/24/17: HERE TODAY FOR NEW INJURY TO HER RIGHT KNEE. STATES HER PAIN HAS FOR 1 MONTH. NO N/T. NOT TAKING ANY MEDICATION FOR PAIN. LEFT KNEE HAS BEEN DOING WELL FROM ORTHOVISC SERIES. RIGHT KNEE WAS PAINFUL WITH STAIRS. 10/30/17: HERE TO F/U ON BILATERAL KNEES. STATES GOOD RELIEF WITH VISCO IN LEFT. RIGHT KNEE PAIN PERSISTS, SIMILAR PAIN TO LEFT KNEE. 11/6/17: ORTHOVISC #2 BILATERAL KNEES 11/13/17: ORTHOVISC #3 BILATERAL KNEES 11/20/17: ORTHOVISC #4 BILATERAL KNEES 8/30/18- for continued orthovisc both knees #2 9/6/18: ORTHOVISC #3 BILAT KNEES 9/13/18: ORTHOVISC #4 BILAT KNEES 4/4/19: HERE TO F/U ON BILATERAL KNEES. ORTHOVISC GAVE GREAT RELIEF, PAIN STARTED TO RETURN 2 WEEKS AGO. 4/19/19 Here for Orthovisc injections #3 both knees. 4/26/19 here for Orthovisc injections #4 both knees.

## 2024-09-20 NOTE — HISTORY OF PRESENT ILLNESS
[de-identified] : 09/20/24:  Patient returns complaining of recurrent left knee pain.  Last cortisone injection about 5 months ago. Pt is scheduled for upcoming rt TKR next month.. Would like to repeat the injection lt knee.  04/22/24:  Returns today c/o severe pain in Rt knee/leg and recurring lt knee pain as well x last 3 weeks duration. Last injection x 5 months ago which helped until now.  11/10/23:  Patient is here today complaining of recurrent bilateral knee pain almost six months after cortisone injections to both knees. Would like to repeat the injections.  05/19/23:  Returns today for complaint of recurrent bilateral knee pain, now over five months after cortisone injections to both knees. Leaving for [place] vacation and would like to repeat the injections.  12/01/22:  Returns today with complaint of recurrent bilateral knee pain.  Previous cortisone injections to both knees about 5 1/2 months ago which helped until now.  06/24/22:  Patient returns today with recurrent bilateral knee pain x last few weeks duration. Known to have OA both knees. Previous cortisone injections to both knees five months ago.Leaving for vacation and would like to repeat the injections.  MRI Right Knee from February of 2020   Impression: 1. 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 2. Mild MCL laxity. 3. No acute fracture. ________________________________  1/17/22 Returns today c/o recurring bilateral knee pain x last 2 weeks duration. Worse stairclimbing. Had a cortisone injections x 4 months ago and did well until now.  09/14/21: Returns today with complaint of recurrent bilateral knee pain. Last cortisone injections to both knees in June of this year which did help, but she did have a fall about five days ago on steps and her right knee pain increased again. Currently it has been improving each day, but pain is worse when getting up from sitting and walking up and down steps. Advil 600 mg for pain, which did help.  06/28/21: Here today with recurrent bilateral knee pain x last 2-3 weeks duration. had excellent relief from her last cortisone injections x 5 months ago. Would like to repeat injections.  01/26/21: Returns today with recurrent bilateral knee pain. Last injection in the right knee in November of last year. Pain is right worse than left and worse going down steps more than up steps. No wake up pain. Deals with the pain when it happens.  11/5/20 returns today c/o recurring rt knee pain x last few weeks duration. Worse stairclimbing and lying in bed. last cortisone injection x 9 months ago.  3/3/20: Pt here for MRI F/U of the right knee. Pt states she feels 75% after cortisone injection last visit. Not going to PT at this moment  2/7/20 Returns today will persistent rt knee pain despite the series of orthovisc injections. Lt knee is feeling better. leaving for vacation out of the country tomorrow.  1/24/20 here for Orthovisc injections #4 both knees.  1/17/20 Here for Orthovisc injections #3 both knees.  01/10/20: Returns today for Orthovisc injections #2 both knees. Reports no relief as of yet.  1/3/20 Here for Orthovisc injections #1 both knees.  12/20/19: Returns today c/o recurring bilateral knee pain rt > lt x last few weeks duration.Takes no nsaids. Last Orthovisc injections were 8 months ago. Would like to repeat the injections.   10/3/16: PATIENT IS A 52 YO FEMALE C/O LEFT KNEE PAIN FOR "YEARS." SHE HAS BEEN DX WITH OA BY DR. MENON AND TREATED WITH 2 ORTHOVISC SERIES WHICH SHE HAS HAD GOOD RELIEF WITH. NO N/T. SHE IS CURRENTLY TAKING MELOXICAM. PAIN IS WORSE WITH STAIRS. NO PREVIOUS INJURIES OR SURGERIES TO LEFT KNEE. PAIN OCC. WAKES HER FROM SLEEP. OCCUPATION:  10/17/16: HERE FOR ORTHOVISC #3 LEFT KNEE. 10/24/16: HERE TO FOR ORTHOVISC #4 LEFT KNEE. 4/24/17: HERE TODAY FOR NEW INJURY TO HER RIGHT KNEE. STATES HER PAIN HAS FOR 1 MONTH. NO N/T. NOT TAKING ANY MEDICATION FOR PAIN. LEFT KNEE HAS BEEN DOING WELL FROM ORTHOVISC SERIES. RIGHT KNEE WAS PAINFUL WITH STAIRS. 10/30/17: HERE TO F/U ON BILATERAL KNEES. STATES GOOD RELIEF WITH VISCO IN LEFT. RIGHT KNEE PAIN PERSISTS, SIMILAR PAIN TO LEFT KNEE. 11/6/17: ORTHOVISC #2 BILATERAL KNEES 11/13/17: ORTHOVISC #3 BILATERAL KNEES 11/20/17: ORTHOVISC #4 BILATERAL KNEES 8/30/18- for continued orthovisc both knees #2 9/6/18: ORTHOVISC #3 BILAT KNEES 9/13/18: ORTHOVISC #4 BILAT KNEES 4/4/19: HERE TO F/U ON BILATERAL KNEES. ORTHOVISC GAVE GREAT RELIEF, PAIN STARTED TO RETURN 2 WEEKS AGO. 4/19/19 Here for Orthovisc injections #3 both knees. 4/26/19 here for Orthovisc injections #4 both knees.

## 2024-09-26 ENCOUNTER — NON-APPOINTMENT (OUTPATIENT)
Age: 59
End: 2024-09-26

## 2024-09-27 ENCOUNTER — APPOINTMENT (OUTPATIENT)
Dept: CARDIOLOGY | Facility: CLINIC | Age: 59
End: 2024-09-27
Payer: COMMERCIAL

## 2024-09-27 ENCOUNTER — NON-APPOINTMENT (OUTPATIENT)
Age: 59
End: 2024-09-27

## 2024-09-27 VITALS — WEIGHT: 179 LBS | BODY MASS INDEX: 32.94 KG/M2 | OXYGEN SATURATION: 98 % | HEART RATE: 46 BPM | HEIGHT: 62 IN

## 2024-09-27 VITALS — DIASTOLIC BLOOD PRESSURE: 90 MMHG | SYSTOLIC BLOOD PRESSURE: 180 MMHG

## 2024-09-27 DIAGNOSIS — R94.31 ABNORMAL ELECTROCARDIOGRAM [ECG] [EKG]: ICD-10-CM

## 2024-09-27 DIAGNOSIS — Z78.9 OTHER SPECIFIED HEALTH STATUS: ICD-10-CM

## 2024-09-27 PROCEDURE — 99204 OFFICE O/P NEW MOD 45 MIN: CPT

## 2024-09-27 PROCEDURE — G2211 COMPLEX E/M VISIT ADD ON: CPT | Mod: NC

## 2024-09-27 PROCEDURE — 93000 ELECTROCARDIOGRAM COMPLETE: CPT

## 2024-09-27 NOTE — HISTORY OF PRESENT ILLNESS
[FreeTextEntry1] : Praveena presented to the office today for a cardiovascular evaluation.  She presents in anticipation of right TKR, and for the further evaluation of an abnormal EKG.  She is now 59 years old, without a history of hypertension, diabetes or hypercholesterolemia.  She is not known to have underlying structural heart disease.  She reports a tendency toward bradycardia at baseline.  At baseline, she has been very sedentary, owing in part to orthopedic issues, and a general lack of motivation to exercise.  With regular physical activities, she feels well, without reproducible chest discomfort or shortness of breath suggestive of angina.  She denies orthopnea, PND and lower extremity edema.  She denies palpitations, dizziness and syncope.  She has more recently been planning knee replacement surgery, presently scheduled for 1 month from now.  A recent EKG in her primary care physician's office revealed a sinus bradycardia with nonspecific T wave abnormalities, she was referred for preop clearance.

## 2024-09-27 NOTE — DISCUSSION/SUMMARY
[FreeTextEntry1] : Her blood pressure is elevated.  Her EKG is abnormal.  It is not clear if these are connected on the basis of left ventricular hypertrophy, or whether her EKG abnormalities are related to something else.  Noting the abnormalities on EKG and her upcoming surgery, have suggested several examinations.  She will schedule an echocardiogram as well as pharmacologic nuclear stress testing, noting her inability to exercise.  She will start checking her blood pressure twice daily over the next week, and send us a list of those readings for my review.  Clearance for surgery will be dependent upon the results of both his examinations, blood pressure control and her overall clinical course.  She will see me in the office in about 3 months to reassess her EKG, and overall clinical course. [EKG obtained to assist in diagnosis and management of assessed problem(s)] : EKG obtained to assist in diagnosis and management of assessed problem(s)

## 2024-10-02 ENCOUNTER — APPOINTMENT (OUTPATIENT)
Dept: CARDIOLOGY | Facility: CLINIC | Age: 59
End: 2024-10-02
Payer: COMMERCIAL

## 2024-10-02 ENCOUNTER — NON-APPOINTMENT (OUTPATIENT)
Age: 59
End: 2024-10-02

## 2024-10-02 PROCEDURE — 93306 TTE W/DOPPLER COMPLETE: CPT

## 2024-10-04 ENCOUNTER — APPOINTMENT (OUTPATIENT)
Dept: CARDIOLOGY | Facility: CLINIC | Age: 59
End: 2024-10-04
Payer: COMMERCIAL

## 2024-10-04 PROCEDURE — 93015 CV STRESS TEST SUPVJ I&R: CPT

## 2024-10-04 PROCEDURE — 78452 HT MUSCLE IMAGE SPECT MULT: CPT

## 2024-10-04 PROCEDURE — A9500: CPT

## 2024-10-08 ENCOUNTER — NON-APPOINTMENT (OUTPATIENT)
Age: 59
End: 2024-10-08

## 2024-10-10 ENCOUNTER — NON-APPOINTMENT (OUTPATIENT)
Age: 59
End: 2024-10-10

## 2024-10-10 ENCOUNTER — OUTPATIENT (OUTPATIENT)
Dept: OUTPATIENT SERVICES | Facility: HOSPITAL | Age: 59
LOS: 1 days | End: 2024-10-10
Payer: COMMERCIAL

## 2024-10-10 VITALS
DIASTOLIC BLOOD PRESSURE: 82 MMHG | RESPIRATION RATE: 16 BRPM | HEART RATE: 59 BPM | WEIGHT: 177.03 LBS | OXYGEN SATURATION: 100 % | HEIGHT: 62 IN | TEMPERATURE: 98 F | SYSTOLIC BLOOD PRESSURE: 140 MMHG

## 2024-10-10 DIAGNOSIS — Z98.82 BREAST IMPLANT STATUS: Chronic | ICD-10-CM

## 2024-10-10 DIAGNOSIS — Z98.890 OTHER SPECIFIED POSTPROCEDURAL STATES: Chronic | ICD-10-CM

## 2024-10-10 DIAGNOSIS — M17.11 UNILATERAL PRIMARY OSTEOARTHRITIS, RIGHT KNEE: ICD-10-CM

## 2024-10-10 DIAGNOSIS — Z87.39 PERSONAL HISTORY OF OTHER DISEASES OF THE MUSCULOSKELETAL SYSTEM AND CONNECTIVE TISSUE: ICD-10-CM

## 2024-10-10 DIAGNOSIS — M75.100 UNSPECIFIED ROTATOR CUFF TEAR OR RUPTURE OF UNSPECIFIED SHOULDER, NOT SPECIFIED AS TRAUMATIC: Chronic | ICD-10-CM

## 2024-10-10 DIAGNOSIS — Z01.818 ENCOUNTER FOR OTHER PREPROCEDURAL EXAMINATION: ICD-10-CM

## 2024-10-10 LAB
ANION GAP SERPL CALC-SCNC: 12 MMOL/L — SIGNIFICANT CHANGE UP (ref 5–17)
BUN SERPL-MCNC: 33 MG/DL — HIGH (ref 7–23)
CALCIUM SERPL-MCNC: 10.2 MG/DL — SIGNIFICANT CHANGE UP (ref 8.4–10.5)
CHLORIDE SERPL-SCNC: 101 MMOL/L — SIGNIFICANT CHANGE UP (ref 96–108)
CO2 SERPL-SCNC: 23 MMOL/L — SIGNIFICANT CHANGE UP (ref 22–31)
CREAT SERPL-MCNC: 0.91 MG/DL — SIGNIFICANT CHANGE UP (ref 0.5–1.3)
EGFR: 72 ML/MIN/1.73M2 — SIGNIFICANT CHANGE UP
GLUCOSE SERPL-MCNC: 98 MG/DL — SIGNIFICANT CHANGE UP (ref 70–99)
HCT VFR BLD CALC: 44.2 % — SIGNIFICANT CHANGE UP (ref 34.5–45)
HGB BLD-MCNC: 14.5 G/DL — SIGNIFICANT CHANGE UP (ref 11.5–15.5)
MCHC RBC-ENTMCNC: 29.3 PG — SIGNIFICANT CHANGE UP (ref 27–34)
MCHC RBC-ENTMCNC: 32.8 GM/DL — SIGNIFICANT CHANGE UP (ref 32–36)
MCV RBC AUTO: 89.3 FL — SIGNIFICANT CHANGE UP (ref 80–100)
NRBC # BLD: 0 /100 WBCS — SIGNIFICANT CHANGE UP (ref 0–0)
PLATELET # BLD AUTO: 207 K/UL — SIGNIFICANT CHANGE UP (ref 150–400)
POTASSIUM SERPL-MCNC: 4.1 MMOL/L — SIGNIFICANT CHANGE UP (ref 3.5–5.3)
POTASSIUM SERPL-SCNC: 4.1 MMOL/L — SIGNIFICANT CHANGE UP (ref 3.5–5.3)
RBC # BLD: 4.95 M/UL — SIGNIFICANT CHANGE UP (ref 3.8–5.2)
RBC # FLD: 12.6 % — SIGNIFICANT CHANGE UP (ref 10.3–14.5)
SODIUM SERPL-SCNC: 136 MMOL/L — SIGNIFICANT CHANGE UP (ref 135–145)
WBC # BLD: 6.37 K/UL — SIGNIFICANT CHANGE UP (ref 3.8–10.5)
WBC # FLD AUTO: 6.37 K/UL — SIGNIFICANT CHANGE UP (ref 3.8–10.5)

## 2024-10-10 PROCEDURE — 87640 STAPH A DNA AMP PROBE: CPT

## 2024-10-10 PROCEDURE — 87641 MR-STAPH DNA AMP PROBE: CPT

## 2024-10-10 PROCEDURE — 80048 BASIC METABOLIC PNL TOTAL CA: CPT

## 2024-10-10 PROCEDURE — 85027 COMPLETE CBC AUTOMATED: CPT

## 2024-10-10 PROCEDURE — G0463: CPT

## 2024-10-10 PROCEDURE — 83036 HEMOGLOBIN GLYCOSYLATED A1C: CPT

## 2024-10-10 RX ORDER — CEFAZOLIN SODIUM 1 G
2000 VIAL (EA) INJECTION ONCE
Refills: 0 | Status: COMPLETED | OUTPATIENT
Start: 2024-10-23 | End: 2024-10-23

## 2024-10-10 RX ORDER — TRAMADOL HYDROCHLORIDE 50 MG/1
50 TABLET, COATED ORAL ONCE
Refills: 0 | Status: DISCONTINUED | OUTPATIENT
Start: 2024-10-23 | End: 2024-10-23

## 2024-10-10 RX ORDER — PANTOPRAZOLE SODIUM 40 MG/1
40 TABLET, DELAYED RELEASE ORAL ONCE
Refills: 0 | Status: COMPLETED | OUTPATIENT
Start: 2024-10-23 | End: 2024-10-23

## 2024-10-10 RX ORDER — LIDOCAINE HCL 60 MG/3 ML
0.2 SYRINGE (ML) INJECTION ONCE
Refills: 0 | Status: DISCONTINUED | OUTPATIENT
Start: 2024-10-23 | End: 2024-10-23

## 2024-10-10 RX ORDER — CHLORHEXIDINE GLUCONATE 40 MG/ML
1 SOLUTION TOPICAL ONCE
Refills: 0 | Status: DISCONTINUED | OUTPATIENT
Start: 2024-10-23 | End: 2024-10-23

## 2024-10-10 RX ORDER — SODIUM CHLORIDE 9 MG/ML
3 INJECTION, SOLUTION INTRAMUSCULAR; INTRAVENOUS; SUBCUTANEOUS EVERY 8 HOURS
Refills: 0 | Status: DISCONTINUED | OUTPATIENT
Start: 2024-10-23 | End: 2024-10-23

## 2024-10-10 NOTE — H&P PST ADULT - NSICDXPASTSURGICALHX_GEN_ALL_CORE_FT
PAST SURGICAL HISTORY:  H/O arthroscopy of right knee 10 years ago    H/O basal cell carcinoma excision     H/O bilateral breast implants     Rotator cuff tear right ,     S/P abdominoplasty     S/P cataract surgery     S/P  Section  &     S/P LASIK surgery     S/P tonsillectomy

## 2024-10-10 NOTE — H&P PST ADULT - PROBLEM SELECTOR PLAN 1
patient scheduled for right total knee arthoplasty on 10/23/24.  PST instructions provided, chlorhexadine scrub and instructions provided.  PAtient instructed to not take any ibuprofen or NSAIDs 1 week prior to surgery. patient scheduled for right total knee arthoplasty on 10/23/24.  PST instructions provided, chlorhexadine scrub and instructions provided.  Patient instructed to not take any ibuprofen or NSAIDs 1 week prior to surgery.

## 2024-10-10 NOTE — H&P PST ADULT - SUBSTANCE USE COMMENT, PROFILE
Head,  normocephalic,  atraumatic,  Face,  Face within normal limits,  Ears,  External ears within normal limits,  Nose/Nasopharynx,  External nose  normal appearance,  nares patent,  no nasal discharge,  Mouth and Throat,  Oral cavity appearance normal,  Breath odor normal,  Lips,  Appearance normal marijuana once per week marijuana once per week- instructed pt to hold marijuana preop op or at least hold 24h preop

## 2024-10-10 NOTE — H&P PST ADULT - ASSESSMENT
DASI score: 6  DASI activity: able to perform ADLs, knee pain with stairs but able to climb up 1-2 flights.   Loose teeth or denture: denies     DASI score: 6  DASI activity: able to perform ADLs, knee pain with stairs but able to climb up 1-2 flights.   Loose teeth or denture: denies    CAPRINI SCORE    AGE RELATED RISK FACTORS                                                             [x ] Age 41-60 years                                            (1 Point)  [ ] Age: 61-74 years                                           (2 Points)                 [ ] Age= 75 years                                                (3 Points)             DISEASE RELATED RISK FACTORS                                                       [ ] Edema in the lower extremities                 (1 Point)                     [ ] Varicose veins                                               (1 Point)                                 [x ] BMI > 25 Kg/m2                                            (1 Point)                                  [ ] Serious infection (ie PNA)                            (1 Point)                     [ ] Lung disease ( COPD, Emphysema)            (1 Point)                                                                          [ ] Acute myocardial infarction                         (1 Point)                  [ ] Congestive heart failure (in the previous month)  (1 Point)         [ ] Inflammatory bowel disease                            (1 Point)                  [ ] Central venous access, PICC or Port               (2 points)       (within the last month)                                                                [ ] Stroke (in the previous month)                        (5 Points)    [ ] Previous or present malignancy                       (2 points)                                                                                                                                                         HEMATOLOGY RELATED FACTORS                                                         [ ] Prior episodes of VTE                                     (3 Points)                     [ ] Positive family history for VTE                      (3 Points)                  [ ] Prothrombin 68107 A                                     (3 Points)                     [ ] Factor V Leiden                                                (3 Points)                        [ ] Lupus anticoagulants                                      (3 Points)                                                           [ ] Anticardiolipin antibodies                              (3 Points)                                                       [ ] High homocysteine in the blood                   (3 Points)                                             [ ] Other congenital or acquired thrombophilia      (3 Points)                                                [ ] Heparin induced thrombocytopenia                  (3 Points)                                        MOBILITY RELATED FACTORS  [ ] Bed rest                                                         (1 Point)  [ ] Plaster cast                                                    (2 points)  [ ] Bed bound for more than 72 hours           (2 Points)    GENDER SPECIFIC FACTORS  [ ] Pregnancy or had a baby within the last month   (1 Point)  [ ] Post-partum < 6 weeks                                   (1 Point)  [ ] Hormonal therapy  or oral contraception   (1 Point)  [ ] History of pregnancy complications              (1 point)  [ ] Unexplained or recurrent              (1 Point)    OTHER RISK FACTORS                                           (1 Point)  [ ] BMI >40, smoking, diabetes requiring insulin, chemotherapy  blood transfusions and length of surgery over 2 hours    SURGERY RELATED RISK FACTORS  [ ]  Section within the last month     (1 Point)  [ ] Minor surgery                                                  (1 Point)  [ ] Arthroscopic surgery                                       (2 Points)  [ ] Planned major surgery lasting more            (2 Points)      than 45 minutes     [x Elective hip or knee joint replacement       (5 points)       surgery                                                TRAUMA RELATED RISK FACTORS  [ ] Fracture of the hip, pelvis, or leg                       (5 Points)  [ ] Spinal cord injury resulting in paralysis             (5 points)       (in the previous month)    [ ] Paralysis  (less than 1 month)                             (5 Points)  [ ] Multiple Trauma within 1 month                        (5 Points)    Total Score [     7   ]    Caprini Score 0-2: Low Risk, NO VTE prophylaxis required for most patients, encourage ambulation  Caprini Score 3-6: Moderate Risk , pharmacologic VTE prophylaxis is indicated for most patients (in the absence of contraindications)  Caprini Score Greater than or =7: High risk, pharmocologic VTE prophylaxis indicated for most patients (in the absence of contraindications)

## 2024-10-10 NOTE — H&P PST ADULT - NSICDXPASTMEDICALHX_GEN_ALL_CORE_FT
PAST MEDICAL HISTORY:  Complete rotator cuff tear left    Gestational diabetes mellitus (GDM)     H/O rosacea     History of osteoarthritis     Lumbar herniated disc     Primary basal cell carcinoma (BCC) of right lower extremity     Rotator cuff tear right

## 2024-10-10 NOTE — H&P PST ADULT - NSANTHOSAYNRD_GEN_A_CORE
No. KEEAGN screening performed.  STOP BANG Legend: 0-2 = LOW Risk; 3-4 = INTERMEDIATE Risk; 5-8 = HIGH Risk

## 2024-10-10 NOTE — H&P PST ADULT - HISTORY OF PRESENT ILLNESS
59 year old female with PMH of OA, sinus bradycardia, basal cell carcinoma of RLL, right rotator cuff tear and PSH basal cell carcinoma excision, cataract surgery, right rotator cuff repair in  and  and left rotator cuff repair in ,  x2, tummy tuck, breast lift and implants, colonoscopy resulting in internal bleeding that required hospitalization presents today with c/o right knee pain due to OA. Pt endorses 10 years of bilateral knee pain, worse on the right knee. Pt states pain is intermittent and is the worst when climbing stairs, relieved with ibuprofen and rated 2/10 currently. Patient denies joint swelling or redness, numbness or tingling in lower extremities. Patient scheduled for right total knee arthoplasty on 10/23/24Patient visited cardiologist Dr Root last week for cardiac evaluation and completed an echo 10/2 and stress test 10/4 in which pt reports normal results. Denies fever, chills, SOB, cough, chest pain, palpitations or syncope. 59 year old female with PMH of OA, sinus bradycardia (HR in 40-50s), basal cell carcinoma of RLL, right rotator cuff tear and PSH basal cell carcinoma excision, cataract surgery, right rotator cuff repair in  and  and left rotator cuff repair in ,  x2, tummy tuck, breast lift and implants, colonoscopy resulting in internal bleeding that required hospitalization presents today with c/o right knee pain due to OA. Pt endorses 10 years of bilateral knee pain, worse on the right knee. Pt states pain is intermittent and is the worst when climbing stairs, relieved with ibuprofen and rated 2/10 currently. Patient denies joint swelling or redness, numbness or tingling in lower extremities. Patient scheduled for right total knee arthoplasty on 10/23/24Patient visited cardiologist Dr Root last week for cardiac evaluation and completed an echo 10/2 and stress test 10/4 (results on chart). Denies fever, chills, SOB, cough, chest pain, palpitations or syncope.

## 2024-10-11 ENCOUNTER — APPOINTMENT (OUTPATIENT)
Dept: INTERNAL MEDICINE | Facility: CLINIC | Age: 59
End: 2024-10-11
Payer: COMMERCIAL

## 2024-10-11 VITALS
OXYGEN SATURATION: 98 % | DIASTOLIC BLOOD PRESSURE: 80 MMHG | HEIGHT: 62 IN | RESPIRATION RATE: 15 BRPM | TEMPERATURE: 99 F | BODY MASS INDEX: 32.76 KG/M2 | WEIGHT: 178 LBS | HEART RATE: 67 BPM | SYSTOLIC BLOOD PRESSURE: 130 MMHG

## 2024-10-11 DIAGNOSIS — Z01.818 ENCOUNTER FOR OTHER PREPROCEDURAL EXAMINATION: ICD-10-CM

## 2024-10-11 DIAGNOSIS — Z78.9 OTHER SPECIFIED HEALTH STATUS: ICD-10-CM

## 2024-10-11 DIAGNOSIS — M17.11 UNILATERAL PRIMARY OSTEOARTHRITIS, RIGHT KNEE: ICD-10-CM

## 2024-10-11 DIAGNOSIS — R00.1 BRADYCARDIA, UNSPECIFIED: ICD-10-CM

## 2024-10-11 DIAGNOSIS — R79.9 ABNORMAL FINDING OF BLOOD CHEMISTRY, UNSPECIFIED: ICD-10-CM

## 2024-10-11 DIAGNOSIS — E66.811 OBESITY, CLASS 1: ICD-10-CM

## 2024-10-11 LAB
A1C WITH ESTIMATED AVERAGE GLUCOSE RESULT: 5.6 % — SIGNIFICANT CHANGE UP (ref 4–5.6)
ESTIMATED AVERAGE GLUCOSE: 114 MG/DL — SIGNIFICANT CHANGE UP (ref 68–114)
MRSA PCR RESULT.: SIGNIFICANT CHANGE UP
S AUREUS DNA NOSE QL NAA+PROBE: SIGNIFICANT CHANGE UP

## 2024-10-11 PROCEDURE — G2211 COMPLEX E/M VISIT ADD ON: CPT | Mod: NC

## 2024-10-11 PROCEDURE — 99214 OFFICE O/P EST MOD 30 MIN: CPT

## 2024-10-21 ENCOUNTER — TRANSCRIPTION ENCOUNTER (OUTPATIENT)
Age: 59
End: 2024-10-21

## 2024-10-21 NOTE — ASU DISCHARGE PLAN (ADULT/PEDIATRIC) - ASU DC SPECIAL INSTRUCTIONSFT
Please follow all instructions given to you by your surgeon and his office  Follow up  as scheduled  Keep dressing clean, dry, and intact as instructed.

## 2024-10-21 NOTE — ASU DISCHARGE PLAN (ADULT/PEDIATRIC) - FINANCIAL ASSISTANCE
[facility] provides services at a reduced cost to those who are determined to be eligible through [facility]’s financial assistance program. Information regarding [facility]’s financial assistance program can be found by going to [email or web address] or by calling [phone].

## 2024-10-23 ENCOUNTER — OUTPATIENT (OUTPATIENT)
Dept: INPATIENT UNIT | Facility: HOSPITAL | Age: 59
LOS: 1 days | End: 2024-10-23
Payer: COMMERCIAL

## 2024-10-23 ENCOUNTER — APPOINTMENT (OUTPATIENT)
Dept: ORTHOPEDIC SURGERY | Facility: HOSPITAL | Age: 59
End: 2024-10-23

## 2024-10-23 VITALS
TEMPERATURE: 98 F | OXYGEN SATURATION: 98 % | HEART RATE: 53 BPM | SYSTOLIC BLOOD PRESSURE: 128 MMHG | DIASTOLIC BLOOD PRESSURE: 82 MMHG | HEIGHT: 62.01 IN | WEIGHT: 177.03 LBS | RESPIRATION RATE: 14 BRPM

## 2024-10-23 VITALS
OXYGEN SATURATION: 97 % | RESPIRATION RATE: 16 BRPM | DIASTOLIC BLOOD PRESSURE: 66 MMHG | HEART RATE: 51 BPM | SYSTOLIC BLOOD PRESSURE: 98 MMHG

## 2024-10-23 DIAGNOSIS — M17.11 UNILATERAL PRIMARY OSTEOARTHRITIS, RIGHT KNEE: ICD-10-CM

## 2024-10-23 DIAGNOSIS — M75.100 UNSPECIFIED ROTATOR CUFF TEAR OR RUPTURE OF UNSPECIFIED SHOULDER, NOT SPECIFIED AS TRAUMATIC: Chronic | ICD-10-CM

## 2024-10-23 DIAGNOSIS — Z98.890 OTHER SPECIFIED POSTPROCEDURAL STATES: Chronic | ICD-10-CM

## 2024-10-23 DIAGNOSIS — Z98.82 BREAST IMPLANT STATUS: Chronic | ICD-10-CM

## 2024-10-23 LAB — GLUCOSE BLDC GLUCOMTR-MCNC: 127 MG/DL — HIGH (ref 70–99)

## 2024-10-23 PROCEDURE — 97161 PT EVAL LOW COMPLEX 20 MIN: CPT

## 2024-10-23 PROCEDURE — 82962 GLUCOSE BLOOD TEST: CPT

## 2024-10-23 PROCEDURE — C1713: CPT

## 2024-10-23 PROCEDURE — 27447 TOTAL KNEE ARTHROPLASTY: CPT | Mod: RT

## 2024-10-23 PROCEDURE — 73560 X-RAY EXAM OF KNEE 1 OR 2: CPT

## 2024-10-23 PROCEDURE — 97165 OT EVAL LOW COMPLEX 30 MIN: CPT

## 2024-10-23 PROCEDURE — C1776: CPT

## 2024-10-23 PROCEDURE — 73560 X-RAY EXAM OF KNEE 1 OR 2: CPT | Mod: 26,RT

## 2024-10-23 DEVICE — ZIMMER FEMALE HEX SCREW MAGNETIC 2.5MM X 25MM: Type: IMPLANTABLE DEVICE | Site: RIGHT | Status: FUNCTIONAL

## 2024-10-23 DEVICE — SURF ART PERSONA MLC RT 12MM: Type: IMPLANTABLE DEVICE | Site: RIGHT | Status: FUNCTIONAL

## 2024-10-23 DEVICE — PATELLA VE 29MM: Type: IMPLANTABLE DEVICE | Site: RIGHT | Status: FUNCTIONAL

## 2024-10-23 DEVICE — CEMENT SIMPLEX P 40GM: Type: IMPLANTABLE DEVICE | Site: RIGHT | Status: FUNCTIONAL

## 2024-10-23 DEVICE — STEM TIBIA PERSONA SZ  5/CR: Type: IMPLANTABLE DEVICE | Site: RIGHT | Status: FUNCTIONAL

## 2024-10-23 DEVICE — FEM PERSONA PS CMT CCR NRW SZ 6 R: Type: IMPLANTABLE DEVICE | Site: RIGHT | Status: FUNCTIONAL

## 2024-10-23 DEVICE — SCREW HEX HEADED 3.5X48MM: Type: IMPLANTABLE DEVICE | Site: RIGHT | Status: FUNCTIONAL

## 2024-10-23 RX ORDER — MAGNESIUM HYDROXIDE 1200 MG/15ML
30 SUSPENSION ORAL
Qty: 0 | Refills: 0 | DISCHARGE
Start: 2024-10-23

## 2024-10-23 RX ORDER — NALOXONE HYDROCHLORIDE 1 MG/ML
1 INJECTION, SOLUTION INTRAMUSCULAR; INTRAVENOUS; SUBCUTANEOUS
Qty: 1 | Refills: 0
Start: 2024-10-23

## 2024-10-23 RX ORDER — POLYETHYLENE GLYCOL 3350 17 G/17G
17 POWDER, FOR SOLUTION ORAL
Qty: 0 | Refills: 0 | DISCHARGE
Start: 2024-10-23

## 2024-10-23 RX ORDER — TRAMADOL HYDROCHLORIDE 50 MG/1
1 TABLET, COATED ORAL
Qty: 28 | Refills: 0
Start: 2024-10-23 | End: 2024-10-29

## 2024-10-23 RX ORDER — MAGNESIUM HYDROXIDE 1200 MG/15ML
30 SUSPENSION ORAL DAILY
Refills: 0 | Status: DISCONTINUED | OUTPATIENT
Start: 2024-10-23 | End: 2024-10-23

## 2024-10-23 RX ORDER — TRAMADOL HYDROCHLORIDE 50 MG/1
1 TABLET, COATED ORAL
Qty: 0 | Refills: 0 | DISCHARGE
Start: 2024-10-23

## 2024-10-23 RX ORDER — NAPROXEN 250 MG/1
1 TABLET ORAL
Qty: 120 | Refills: 0
Start: 2024-10-23 | End: 2024-11-21

## 2024-10-23 RX ORDER — SENNA 187 MG
2 TABLET ORAL
Qty: 0 | Refills: 0 | DISCHARGE
Start: 2024-10-23

## 2024-10-23 RX ORDER — SODIUM CHLORIDE 9 MG/ML
500 INJECTION, SOLUTION INTRAMUSCULAR; INTRAVENOUS; SUBCUTANEOUS ONCE
Refills: 0 | Status: COMPLETED | OUTPATIENT
Start: 2024-10-23 | End: 2024-10-23

## 2024-10-23 RX ORDER — POLYETHYLENE GLYCOL 3350 17 G/17G
17 POWDER, FOR SOLUTION ORAL AT BEDTIME
Refills: 0 | Status: DISCONTINUED | OUTPATIENT
Start: 2024-10-23 | End: 2024-10-23

## 2024-10-23 RX ORDER — ASPIRIN/MAG CARB/ALUMINUM AMIN 325 MG
81 TABLET ORAL
Refills: 0 | Status: DISCONTINUED | OUTPATIENT
Start: 2024-10-23 | End: 2024-10-23

## 2024-10-23 RX ORDER — OXYCODONE HYDROCHLORIDE 30 MG/1
10 TABLET ORAL EVERY 4 HOURS
Refills: 0 | Status: DISCONTINUED | OUTPATIENT
Start: 2024-10-23 | End: 2024-10-23

## 2024-10-23 RX ORDER — HYDROMORPHONE HCL/0.9% NACL/PF 6 MG/30 ML
0.5 PATIENT CONTROLLED ANALGESIA SYRINGE INTRAVENOUS ONCE
Refills: 0 | Status: DISCONTINUED | OUTPATIENT
Start: 2024-10-23 | End: 2024-10-23

## 2024-10-23 RX ORDER — ACETAMINOPHEN 500 MG
2 TABLET ORAL
Qty: 240 | Refills: 0
Start: 2024-10-23 | End: 2024-11-21

## 2024-10-23 RX ORDER — NAPROXEN 250 MG/1
1 TABLET ORAL
Qty: 60 | Refills: 0
Start: 2024-10-23 | End: 2024-11-21

## 2024-10-23 RX ORDER — INFLUENZ VIR VAC TV P-SURF2003 15MCG/.5ML
0.5 SYRINGE (ML) INTRAMUSCULAR ONCE
Refills: 0 | Status: DISCONTINUED | OUTPATIENT
Start: 2024-10-23 | End: 2024-10-23

## 2024-10-23 RX ORDER — ASPIRIN/MAG CARB/ALUMINUM AMIN 325 MG
1 TABLET ORAL
Qty: 84 | Refills: 0
Start: 2024-10-23 | End: 2024-12-03

## 2024-10-23 RX ORDER — 5-HYDROXYTRYPTOPHAN (5-HTP) 100 MG
1 TABLET,DISINTEGRATING ORAL
Refills: 0 | DISCHARGE

## 2024-10-23 RX ORDER — METRONIDAZOLE 7.5 MG/G
1 GEL TOPICAL
Refills: 0 | DISCHARGE

## 2024-10-23 RX ORDER — ACETAMINOPHEN 500 MG
2 TABLET ORAL
Qty: 120 | Refills: 0
Start: 2024-10-23 | End: 2024-11-21

## 2024-10-23 RX ORDER — CEFAZOLIN SODIUM 1 G
2000 VIAL (EA) INJECTION EVERY 8 HOURS
Refills: 0 | Status: COMPLETED | OUTPATIENT
Start: 2024-10-23 | End: 2024-10-23

## 2024-10-23 RX ORDER — OXYCODONE HYDROCHLORIDE 30 MG/1
5 TABLET ORAL EVERY 4 HOURS
Refills: 0 | Status: DISCONTINUED | OUTPATIENT
Start: 2024-10-23 | End: 2024-10-23

## 2024-10-23 RX ORDER — SENNA 187 MG
2 TABLET ORAL AT BEDTIME
Refills: 0 | Status: DISCONTINUED | OUTPATIENT
Start: 2024-10-23 | End: 2024-10-23

## 2024-10-23 RX ORDER — ASPIRIN/MAG CARB/ALUMINUM AMIN 325 MG
1 TABLET ORAL
Qty: 0 | Refills: 0 | DISCHARGE
Start: 2024-10-23

## 2024-10-23 RX ORDER — ACETAMINOPHEN 500 MG
650 TABLET ORAL EVERY 8 HOURS
Refills: 0 | Status: DISCONTINUED | OUTPATIENT
Start: 2024-10-23 | End: 2024-10-23

## 2024-10-23 RX ORDER — OXYCODONE HYDROCHLORIDE 30 MG/1
1 TABLET ORAL
Qty: 20 | Refills: 0
Start: 2024-10-23

## 2024-10-23 RX ORDER — TRAMADOL HYDROCHLORIDE 50 MG/1
50 TABLET, COATED ORAL EVERY 6 HOURS
Refills: 0 | Status: DISCONTINUED | OUTPATIENT
Start: 2024-10-23 | End: 2024-10-23

## 2024-10-23 RX ORDER — NAPROXEN 250 MG/1
500 TABLET ORAL EVERY 6 HOURS
Refills: 0 | Status: DISCONTINUED | OUTPATIENT
Start: 2024-10-23 | End: 2024-10-23

## 2024-10-23 RX ORDER — ONDANSETRON HYDROCHLORIDE 2 MG/ML
4 INJECTION, SOLUTION INTRAMUSCULAR; INTRAVENOUS EVERY 6 HOURS
Refills: 0 | Status: DISCONTINUED | OUTPATIENT
Start: 2024-10-23 | End: 2024-10-23

## 2024-10-23 RX ADMIN — TRAMADOL HYDROCHLORIDE 50 MILLIGRAM(S): 50 TABLET, COATED ORAL at 07:21

## 2024-10-23 RX ADMIN — PANTOPRAZOLE SODIUM 40 MILLIGRAM(S): 40 TABLET, DELAYED RELEASE ORAL at 07:21

## 2024-10-23 RX ADMIN — SODIUM CHLORIDE 500 MILLILITER(S): 9 INJECTION, SOLUTION INTRAMUSCULAR; INTRAVENOUS; SUBCUTANEOUS at 11:14

## 2024-10-23 RX ADMIN — SODIUM CHLORIDE 3 MILLILITER(S): 9 INJECTION, SOLUTION INTRAMUSCULAR; INTRAVENOUS; SUBCUTANEOUS at 06:28

## 2024-10-23 NOTE — PRE-ANESTHESIA EVALUATION ADULT - NSANTHPMHFT_GEN_ALL_CORE
chart and consultant notes reviewed. no hx sig cv/pulm dz - states et > 1 flight, no cp/sob. cv w/u following ekg with bradycardia - unremarkable tte and stress. home bp monitoring sbp 130's

## 2024-10-23 NOTE — OCCUPATIONAL THERAPY INITIAL EVALUATION ADULT - PERTINENT HX OF CURRENT PROBLEM, REHAB EVAL
59 year old female with PMH of OA, sinus bradycardia (HR in 40-50s), basal cell carcinoma of RLL, right rotator cuff tear and PSH basal cell carcinoma excision, cataract surgery, right rotator cuff repair in  and  and left rotator cuff repair in ,  x2, tummy tuck, breast lift and implants, colonoscopy resulting in internal bleeding that required hospitalization presents today with c/o right knee pain due to OA. Pt endorses 10 years of bilateral knee pain, worse on the right knee. Pt states pain is intermittent and is the worst when climbing stairs, relieved with ibuprofen and rated 2/10 currently. Patient denies joint swelling or redness, numbness or tingling in lower extremities. Patient scheduled for right total knee arthoplasty on 10/23/24. Patient visited cardiologist Dr Root last week for cardiac evaluation and completed an echo 10/2 and stress test 10/4 (results on chart). Denies fever, chills, SOB, cough, chest pain, palpitations or syncope. Pt now s/p R TKA (10/23).

## 2024-10-23 NOTE — PHYSICAL THERAPY INITIAL EVALUATION ADULT - ACTIVE RANGE OF MOTION EXAMINATION, REHAB EVAL
R knee -10*-90* limited by ACE bandage/no Active ROM deficits were identified/deficits as listed below

## 2024-10-23 NOTE — PATIENT PROFILE ADULT - BILL PAYMENT
Vaccine Information Statement(s) was given today. This has been reviewed, questions answered, and verbal consent given by Patient for injection(s) and administration of Influenza (Inactivated). 1. Does the patient have a moderate to severe fever? No  2. Has the patient had a serious reaction to a flu shot before? No  3. Has the patient ever had Guillian [place] Syndrome within 6 weeks of a previous flu shot? No  4. Does the patient have a serious allergy to eggs? No  5. Is the patient less that 10months of age? No    Patient is eligible to receive the vaccine based on all questions being answered as 'No'. Patient tolerated without incident. See immunization grid for documentation. no

## 2024-10-23 NOTE — PATIENT PROFILE ADULT - FALL HARM RISK - UNIVERSAL INTERVENTIONS
[FreeTextEntry1] : Laboratories (February 28, 2023) reviewed and significant for:  Unremarkable complete blood count Calcium 9.9 mg/dL (albumin 4.3 g/dL) 25-hydroxyvitamin D 39 ng/mL PTH 76 pg/mL (normal: 16-77) BUN/creatinine 16/0.82 mg/dL (eGFR 80 mL/min) Alkaline phosphatase 68 U/L Phosphorus 4.3 mg/dL Magnesium 2.0 mg/dL TSH 2.48 uIU/mL (normal: 0.40-4.50)  Laboratories (December 6, 2022) reviewed and significant for:  Unremarkable complete blood count Calcium 9.6 mg/dL (albumin 4.3 g/dL) 25-hydroxyvitamin D 35 ng/mL PTH 79 pg/mL (normal: 16-77) BUN/creatinine 18/0.85 mg/dL (eGFR 77 mL/min) Alkaline phosphatase 63 U/L Phosphorus 4.3 mg/dL Magnesium 2.1 mg/dL TSH 5.66 uIU/mL (normal: 0.40-4.50)  Most recent bone mineral density Date: August 19, 2023 Source: [facility] Site: [facility]  Site BMD T-score Change previous Change baseline  Lumbar spine 0.999 -0.4    Femoral neck 0.639 -1.9   Total hip 0.848 -0.8    Distal radius 0.574 -2.0    DXA comments: Baseline scan at this facility; left hip values Vertebral fracture assessment without evidence of compression fractures T7 to L5 (my read)  Impression: I have personally reviewed the DXA images and report, and I compared these images to a DXA dated August 18, 2022 from [facility]. There is osteopenia by the World Health Organization criteria. While not directly comparable, bone density is overall stable from previous. The possible change at the distal radius may be due at least in part to positioning.
Bed in lowest position, wheels locked, appropriate side rails in place/Call bell, personal items and telephone in reach/Instruct patient to call for assistance before getting out of bed or chair/Non-slip footwear when patient is out of bed/[place] to call system/Physically safe environment - no spills, clutter or unnecessary equipment/Purposeful Proactive Rounding/Room/bathroom lighting operational, light cord in reach

## 2024-10-23 NOTE — PATIENT PROFILE ADULT - NSTRANSFERBELONGINGSRESP_GEN_A_NUR
yes Unna Boot Text: An Unna boot was placed to help immobilize the limb and facilitate more rapid healing.

## 2024-10-23 NOTE — OCCUPATIONAL THERAPY INITIAL EVALUATION ADULT - LIVES WITH, PROFILE
Pt lives with partner in condo with 1 NEO. Pt has a walk in shower with shower chair/significant other

## 2024-10-23 NOTE — PRE-ANESTHESIA EVALUATION ADULT - LAST ECHOCARDIOGRAM
Cómo entrenar a cline hijo a usar el inodoro: Instrucciones de cuidado  Toilet Training Your Child: Care Instructions  Generalidades  Muchos padres no están seguros de cuándo comenzar a enseñar a los niños a ir al baño. Lo mejor es esperar hasta que cline hijo esté verdaderamente listo. Un shira puede estar listo físicamente después de los 18 meses de edad. Belen podría llevarle más tiempo estar listo afectivamente. Existen [place] de entrenar para el uso del inodoro. Comience explicándole a cline hijo cómo usar el inodoro. Es posible que tenga que repetirlo [email or web address]. Cuando cline hijo muestre interés o progreso, sandro puede responder con elogios y [place]. El entrenamiento para usar el inodoro funciona mejor cuando la experiencia es positiva. Si se convierte en dimple royal o en dimple anthony de voluntades, es mejor suspenderlo por un tiempo. Usted podría estar listo para enseñarle a ir al baño. Belen cline hijo podría no estarlo. Sea paciente y espere con ganas el momento en que se libere de los pañales. La atención de seguimiento es dimple parte clave del tratamiento y la seguridad de cline hijo. Asegúrese de hacer y acudir a todas las citas, y llame a cline médico si cline hijo está teniendo problemas. También es dimple buena idea saber los resultados de los exámenes de cline hijo y mantener dimple lista de los medicamentos que pamela. ¿Cómo puede cuidar a cline hijo en el hogar? Para comenzar  · Asegúrese de que sea un buen momento para comenzar. Es mejor si todos los miembros de la momo pueden ayudar. Es posible que no sea el momento adecuado si cline momo está pasando por grandes cambios. Los grandes cambios pueden incluir la llegada de un nuevo bebé, Tila, o un cambio de escuela o guardería infantil. · Hable con cline hijo acerca de las acciones de defecar y orinar. Es posible que cline hijo prefiera las palabras \"hacer popó\" y \"hacer pipí\". Está dimitry utilizar estas palabras. Belen [place] términos más formales. Así cline hijo aprenderá lo que significan. · Si decide utilizar dimple bacinilla o un inodoro portátil, deje que cline hijo seleccione wesley que sea resistente y cómodo. Sea paciente y altaf tiempo a cline hijo para que se acostumbre a él. · Hable con cline hijo acerca de cómo usar el inodoro o la bacinilla. Explíquele cómo funciona. · Altaf tiempo a cline hijo para que se acostumbre a la idea de usar el inodoro o la bacinilla. Deje que cline hijo se siente en él y pal un libro. O deje que cline hijo se siente en él con el pañal puesto mientras defeca u orina. Cuando cline hijo esté preparado  · Vístalo con ropa que sea fácil de quitar. La ropa con cintura elástica está dimitry. Los pañales de entrenamiento también funcionan dimitry. · Ayúdele a que se sienta cómodo y seguro en el inodoro. Cline hijo betty vez prefiera sentarse hacia atrás. O usted quizás prefiera usar un asiento de inodoro para niños. Hoopa es un asiento que se fija al asiento normal del inodoro. Asegúrese de decirle a cline hijo que no se caerá adentro. · No obligue a cline hijo a sentarse en el inodoro. Deje que se siente en el inodoro solo evangelista 5 minutos a la vez a menos que esté defecando u orinando. · Si cline hijo es varón, es más fácil enseñarle a orinar mientras está sentado en la taza del inodoro. Algunos niños pueden necesitar empujar el pene hacia abajo para que la orina caiga en la taza y no en asiento. A medida que cline hijo crece, puede aprender a orinar de pie. Un taburete pequeño podría ayudarle a mejorar cline puntería. · Enséñele a cline hijo a limpiarse dimitry. Muéstrele cómo paz papel higiénico del rollo, limpiarse y tirar el papel higiénico usado en la taza del baño. Muchos niños necesitan ayuda para limpiarse, especialmente después de dimple evacuación, hasta aproximadamente los 4 o 5 años de [place]. · Ayude a cline hijo a tirar de la garcia. Si cline hijo tiene miedo de tirar de la garcia, hágalo usted después de que haya salido del baño.  Con el tiempo, cline hijo será capaz de tirar de la garcia sin problemas. · Enséñele a cline hijo a lavarse las ernestina después de usar el inodoro. · Elogie y anime a cline hijo por tratar de usar el inodoro. Beto vez quiera premiar a cline hijo con actividades divertidas. Estas pueden incluir pegatinas o con tiempo especial de juego con usted. · No se enfade ni castigue a cline hijo si moja o ensucia leanna pantalones accidentalmente. Dígale a cline hijo que no importa y que lo hará mejor con la práctica. ¿Cuándo debe pedir ayuda? Preste especial atención a los [employer] josef de cline hijo y asegúrese de comunicarse con cline médico si:    · Necesita ayuda con el entrenamiento para usar el inodoro. ¿Dónde puede encontrar más información en inglés? Vaya a [email or web address]  Chantal Mar [id number] en la búsqueda para aprender más acerca de \"Cómo entrenar a cline hijo a usar el inodoro: Instrucciones de cuidado. \"  Revisado: 20 septiembre, 2021               Versión del contenido: 13.2  © 4163-8417 [employer]. Las instrucciones de cuidado fueron adaptadas bajo licencia por [employer] (which disclaims liability or warranty for this information). Si usted tiene [place] afección médica o sobre estas instrucciones, siempre pregunte a cline profesional de josef. [employer]wise, Incorporated niega toda garantía o responsabilidad por cline uso de esta información. 10/02/2024 report on chart

## 2024-10-23 NOTE — CHART NOTE - NSCHARTNOTEFT_GEN_A_CORE
Patient was seen and evaluated at bedside. No chest pain, SOB, N/V/D/HA.     T(C): 36.1 (10-23-24 @ 10:20), Max: 36.6 (10-23-24 @ 05:30)  HR: 41 (10-23-24 @ 14:30) (41 - 71)  BP: 99/52 (10-23-24 @ 14:30) (99/52 - 142/68)  RR: 16 (10-23-24 @ 14:30) (14 - 16)  SpO2: 93% (10-23-24 @ 14:30) (92% - 100%)  Wt(kg): --    Exam:  Alert and Oriented, No Acute Distress  Cardiac: Normal S1 & S2, RRR, No murmurs, rubs or gallops appreciated.  Pulmonary: normal breathing effort, no retractions, no diminished lung sounds appreciated.  Bronchial/Vesicular lungs sounds appreciated throughout all lung lobes.  Lower Extremities: Right Lower Extremity   Dressing: ACE dressing and underlying Aquacel dressing (x1) over R Knee C/D/I  Calves Soft, Non-tender bilaterally  Motor: 5/5 (+) PF/DF/EHL/FHL  Sensory: SILT  2+ DP/PT Pulse  Warm and well-perfused, cap refill < 2 sec.     Xray: s/p R Knee Arthroplasty   IMPRESSION:  Unconstrained right total knee prosthesis implanted.  Intact and aligned hardware and no periprosthetic fractures.  Postoperative soft tissue changes.  Correlate with intraoperative findings.    Labs:     A/p: 59yFemale s/p R Total Knee Arthroplasty. VSS. NAD.  PT/OT- WBAT RLE  DVT ppx: Aspirin 81 mg BID, SCD   IS  Pain Control  Continue Current Tx.  Dispo planning: cleared from PT/OT perspective for home, DC home when Anesthesia criteria met, follow discharge instructions given by Dr. Jeffrey pre-operatively.     Eric Mckenzie PA-C  Orthopedic Surgery   Team Pager: #[phone]

## 2024-10-23 NOTE — PHYSICAL THERAPY INITIAL EVALUATION ADULT - ADDITIONAL COMMENTS
Pt lives in a private house with spouse with one flight of steps inside. Pt was Ind with all ADLs and amb without AD. Pt reports plans to say on main level of house for the first couple of days

## 2024-10-23 NOTE — PHYSICAL THERAPY INITIAL EVALUATION ADULT - PASSIVE RANGE OF MOTION EXAMINATION, REHAB EVAL
R knee -10*-90* limited by ACE bandage/no Passive ROM deficits were identified/deficits as listed below

## 2024-10-30 ENCOUNTER — NON-APPOINTMENT (OUTPATIENT)
Age: 59
End: 2024-10-30

## 2024-11-05 RX ORDER — TRAMADOL HYDROCHLORIDE 50 MG/1
50 TABLET, COATED ORAL
Qty: 30 | Refills: 0 | Status: ACTIVE | COMMUNITY
Start: 2024-11-05 | End: 1900-01-01

## 2024-11-05 RX ORDER — OXYCODONE 5 MG/1
5 TABLET ORAL
Qty: 20 | Refills: 0 | Status: ACTIVE | COMMUNITY
Start: 2024-11-05 | End: 1900-01-01

## 2024-11-09 ENCOUNTER — APPOINTMENT (OUTPATIENT)
Dept: ULTRASOUND IMAGING | Facility: CLINIC | Age: 59
End: 2024-11-09

## 2024-11-09 ENCOUNTER — OUTPATIENT (OUTPATIENT)
Dept: OUTPATIENT SERVICES | Facility: HOSPITAL | Age: 59
LOS: 1 days | End: 2024-11-09

## 2024-11-09 DIAGNOSIS — Z98.890 OTHER SPECIFIED POSTPROCEDURAL STATES: Chronic | ICD-10-CM

## 2024-11-09 DIAGNOSIS — Z98.82 BREAST IMPLANT STATUS: Chronic | ICD-10-CM

## 2024-11-09 DIAGNOSIS — Z00.8 ENCOUNTER FOR OTHER GENERAL EXAMINATION: ICD-10-CM

## 2024-11-09 DIAGNOSIS — M75.100 UNSPECIFIED ROTATOR CUFF TEAR OR RUPTURE OF UNSPECIFIED SHOULDER, NOT SPECIFIED AS TRAUMATIC: Chronic | ICD-10-CM

## 2024-11-09 PROCEDURE — 93971 EXTREMITY STUDY: CPT | Mod: 26,RT

## 2024-11-19 ENCOUNTER — APPOINTMENT (OUTPATIENT)
Dept: ORTHOPEDIC SURGERY | Facility: CLINIC | Age: 59
End: 2024-11-19
Payer: COMMERCIAL

## 2024-11-19 ENCOUNTER — NON-APPOINTMENT (OUTPATIENT)
Age: 59
End: 2024-11-19

## 2024-11-19 DIAGNOSIS — M17.11 UNILATERAL PRIMARY OSTEOARTHRITIS, RIGHT KNEE: ICD-10-CM

## 2024-11-19 PROCEDURE — 99024 POSTOP FOLLOW-UP VISIT: CPT

## 2024-11-19 PROCEDURE — 73564 X-RAY EXAM KNEE 4 OR MORE: CPT | Mod: RT

## 2024-11-21 RX ORDER — NAPROXEN 500 MG/1
500 TABLET ORAL
Qty: 60 | Refills: 2 | Status: ACTIVE | COMMUNITY
Start: 2024-11-21 | End: 1900-01-01

## 2024-11-27 ENCOUNTER — NON-APPOINTMENT (OUTPATIENT)
Age: 59
End: 2024-11-27

## 2024-12-20 ENCOUNTER — NON-APPOINTMENT (OUTPATIENT)
Age: 59
End: 2024-12-20

## 2025-01-06 ENCOUNTER — NON-APPOINTMENT (OUTPATIENT)
Age: 60
End: 2025-01-06

## 2025-01-09 ENCOUNTER — APPOINTMENT (OUTPATIENT)
Dept: ORTHOPEDIC SURGERY | Facility: CLINIC | Age: 60
End: 2025-01-09

## 2025-01-09 DIAGNOSIS — S83.242D OTHER TEAR OF MEDIAL MENISCUS, CURRENT INJURY, LEFT KNEE, SUBSEQUENT ENCOUNTER: ICD-10-CM

## 2025-01-09 DIAGNOSIS — M17.12 UNILATERAL PRIMARY OSTEOARTHRITIS, LEFT KNEE: ICD-10-CM

## 2025-01-09 PROCEDURE — 20610 DRAIN/INJ JOINT/BURSA W/O US: CPT | Mod: LT

## 2025-01-23 ENCOUNTER — APPOINTMENT (OUTPATIENT)
Dept: CARDIOLOGY | Facility: CLINIC | Age: 60
End: 2025-01-23
Payer: COMMERCIAL

## 2025-01-23 ENCOUNTER — NON-APPOINTMENT (OUTPATIENT)
Age: 60
End: 2025-01-23

## 2025-01-23 VITALS
BODY MASS INDEX: 33.86 KG/M2 | WEIGHT: 184 LBS | SYSTOLIC BLOOD PRESSURE: 146 MMHG | HEIGHT: 62 IN | OXYGEN SATURATION: 99 % | DIASTOLIC BLOOD PRESSURE: 85 MMHG | HEART RATE: 61 BPM

## 2025-01-23 DIAGNOSIS — R94.31 ABNORMAL ELECTROCARDIOGRAM [ECG] [EKG]: ICD-10-CM

## 2025-01-23 PROCEDURE — 93000 ELECTROCARDIOGRAM COMPLETE: CPT

## 2025-01-23 PROCEDURE — G2211 COMPLEX E/M VISIT ADD ON: CPT | Mod: NC

## 2025-01-23 PROCEDURE — 99214 OFFICE O/P EST MOD 30 MIN: CPT

## 2025-02-07 ENCOUNTER — APPOINTMENT (OUTPATIENT)
Dept: ORTHOPEDIC SURGERY | Facility: CLINIC | Age: 60
End: 2025-02-07
Payer: COMMERCIAL

## 2025-02-07 VITALS — BODY MASS INDEX: 32.76 KG/M2 | HEIGHT: 62 IN | WEIGHT: 178 LBS

## 2025-02-07 DIAGNOSIS — M17.12 UNILATERAL PRIMARY OSTEOARTHRITIS, LEFT KNEE: ICD-10-CM

## 2025-02-07 DIAGNOSIS — M17.11 UNILATERAL PRIMARY OSTEOARTHRITIS, RIGHT KNEE: ICD-10-CM

## 2025-02-07 PROCEDURE — 99213 OFFICE O/P EST LOW 20 MIN: CPT

## 2025-03-22 ENCOUNTER — NON-APPOINTMENT (OUTPATIENT)
Age: 60
End: 2025-03-22

## 2025-04-25 ENCOUNTER — APPOINTMENT (OUTPATIENT)
Dept: DERMATOLOGY | Facility: CLINIC | Age: 60
End: 2025-04-25
Payer: COMMERCIAL

## 2025-04-25 VITALS — HEIGHT: 62 IN | WEIGHT: 180 LBS | BODY MASS INDEX: 33.13 KG/M2

## 2025-04-25 DIAGNOSIS — L71.9 ROSACEA, UNSPECIFIED: ICD-10-CM

## 2025-04-25 DIAGNOSIS — D18.01 HEMANGIOMA OF SKIN AND SUBCUTANEOUS TISSUE: ICD-10-CM

## 2025-04-25 DIAGNOSIS — L82.1 OTHER SEBORRHEIC KERATOSIS: ICD-10-CM

## 2025-04-25 DIAGNOSIS — C44.712 BASAL CELL CARCINOMA OF SKIN OF RIGHT LOWER LIMB, INCLUDING HIP: ICD-10-CM

## 2025-04-25 PROCEDURE — 99214 OFFICE O/P EST MOD 30 MIN: CPT

## 2025-06-23 ENCOUNTER — TRANSCRIPTION ENCOUNTER (OUTPATIENT)
Age: 60
End: 2025-06-23

## 2025-08-08 ENCOUNTER — APPOINTMENT (OUTPATIENT)
Dept: ORTHOPEDIC SURGERY | Facility: CLINIC | Age: 60
End: 2025-08-08
Payer: COMMERCIAL

## 2025-08-08 VITALS — WEIGHT: 180 LBS | BODY MASS INDEX: 33.13 KG/M2 | HEIGHT: 62 IN

## 2025-08-08 DIAGNOSIS — Z96.651 PRESENCE OF RIGHT ARTIFICIAL KNEE JOINT: ICD-10-CM

## 2025-08-08 DIAGNOSIS — M17.12 UNILATERAL PRIMARY OSTEOARTHRITIS, LEFT KNEE: ICD-10-CM

## 2025-08-08 DIAGNOSIS — S83.242D OTHER TEAR OF MEDIAL MENISCUS, CURRENT INJURY, LEFT KNEE, SUBSEQUENT ENCOUNTER: ICD-10-CM

## 2025-08-08 PROCEDURE — 20610 DRAIN/INJ JOINT/BURSA W/O US: CPT | Mod: LT

## 2025-08-08 PROCEDURE — 99213 OFFICE O/P EST LOW 20 MIN: CPT | Mod: 25

## 2025-08-19 ENCOUNTER — NON-APPOINTMENT (OUTPATIENT)
Age: 60
End: 2025-08-19

## (undated) DEVICE — DRSG DERMABOND 0.7ML

## (undated) DEVICE — DRAPE 3/4 SHEET W REINFORCEMENT 56X77"

## (undated) DEVICE — VENODYNE/SCD SLEEVE CALF MEDIUM

## (undated) DEVICE — SUT POLYSORB 1 36" GS-21 UNDYED

## (undated) DEVICE — ELCTR BOVIE PENCIL SMOKE EVACUATION

## (undated) DEVICE — SAW BLADE STRYKER SYSTEM 6 SAGITTAL 13X1.19X90MM

## (undated) DEVICE — SAW BLADE STRYKER 13X1.27X90MM

## (undated) DEVICE — DRSG AQUACEL 3.5 X 12"

## (undated) DEVICE — TOURNIQUET CUFF 34" DUAL PORT W PLC

## (undated) DEVICE — SUT POLYSORB 0 30" GS-21 UNDYED

## (undated) DEVICE — ELCTR GROUNDING PAD ADULT COVIDIEN

## (undated) DEVICE — GLV 8 PROTEXIS (WHITE)

## (undated) DEVICE — PACK TOTAL KNEE (2 PACKS)

## (undated) DEVICE — POSITIONER FOAM HEADREST (PINK)

## (undated) DEVICE — STRYKER MIXEVAC 3 BONE CEMENT MIXER

## (undated) DEVICE — WARMING BLANKET UPPER ADULT

## (undated) DEVICE — HOOD T7 NON-PEELAWAY

## (undated) DEVICE — SUT POLYSORB 2-0 30" GS-21 UNDYED

## (undated) DEVICE — GLV 7 PROTEXIS (WHITE)

## (undated) DEVICE — SPECIMEN CONTAINER 100ML

## (undated) DEVICE — SOL IRR BAG NS 0.9% 3000ML

## (undated) DEVICE — SYR LUER LOK 20CC

## (undated) DEVICE — HOOD T7 PLUS PEELAWAY

## (undated) DEVICE — TAPE SILK 3"

## (undated) DEVICE — GLV 7 PROTEXIS (BLUE)

## (undated) DEVICE — SOL IRR POUR H2O 1000ML

## (undated) DEVICE — POSITIONER FOAM EGG CRATE ULNAR 2PCS (PINK)

## (undated) DEVICE — SAW BLADE STRYKER SAGITTAL 25X1.27X90

## (undated) DEVICE — BLADE SCALPEL SAFETYLOCK #15

## (undated) DEVICE — SPLINT IMMOBILIZER 3-PANEL KNEE 20"

## (undated) DEVICE — SUT MONOCRYL 3-0 27" PS-2 UNDYED

## (undated) DEVICE — DRSG WEBRIL 6"

## (undated) DEVICE — NDL HYPO SAFE 18G X 1.5" (PINK)

## (undated) DEVICE — SOL IRR POUR NS 0.9% 500ML